# Patient Record
Sex: FEMALE | Race: WHITE | NOT HISPANIC OR LATINO | Employment: FULL TIME | URBAN - METROPOLITAN AREA
[De-identification: names, ages, dates, MRNs, and addresses within clinical notes are randomized per-mention and may not be internally consistent; named-entity substitution may affect disease eponyms.]

---

## 2017-04-01 DIAGNOSIS — Z30.019 ENCOUNTER FOR INITIAL PRESCRIPTION OF CONTRACEPTIVES: ICD-10-CM

## 2017-04-03 RX ORDER — DROSPIRENONE AND ETHINYL ESTRADIOL 0.03MG-3MG
KIT ORAL
Qty: 28 TABLET | Refills: 11 | Status: SHIPPED | OUTPATIENT
Start: 2017-04-03 | End: 2017-05-01

## 2017-04-03 NOTE — TELEPHONE ENCOUNTER
drospirenone-ethinyl estradiol (KIMMY) 3-0.03 MG per tablet    Last Written Prescription Date: 4/18/16  Last Fill Quantity: 28,  # refills: 11   Last Office Visit with [facility], [facility] or [facility] prescribing provider: 4/18/16

## 2017-04-03 NOTE — TELEPHONE ENCOUNTER
Ok to fill per RF. Made appt for physical for Destiny per RF.  Will refill 1 more time.  Mikala Doran, [facility]

## 2017-05-01 ENCOUNTER — OFFICE VISIT (OUTPATIENT)
Dept: FAMILY MEDICINE | Facility: CLINIC | Age: 27
End: 2017-05-01
Payer: COMMERCIAL

## 2017-05-01 ENCOUNTER — RESULT FOLLOW UP (OUTPATIENT)
Dept: FAMILY MEDICINE | Facility: CLINIC | Age: 27
End: 2017-05-01

## 2017-05-01 VITALS
DIASTOLIC BLOOD PRESSURE: 78 MMHG | HEIGHT: 66 IN | SYSTOLIC BLOOD PRESSURE: 130 MMHG | OXYGEN SATURATION: 100 % | RESPIRATION RATE: 16 BRPM | WEIGHT: 131.25 LBS | TEMPERATURE: 98.3 F | HEART RATE: 75 BPM | BODY MASS INDEX: 21.09 KG/M2

## 2017-05-01 DIAGNOSIS — Z00.00 ROUTINE GENERAL MEDICAL EXAMINATION AT A HEALTH CARE FACILITY: Primary | ICD-10-CM

## 2017-05-01 DIAGNOSIS — Z30.41 SURVEILLANCE OF PREVIOUSLY PRESCRIBED CONTRACEPTIVE PILL: ICD-10-CM

## 2017-05-01 DIAGNOSIS — R87.611 PAP SMEAR OF CERVIX WITH ASCUS, CANNOT EXCLUDE HGSIL: ICD-10-CM

## 2017-05-01 DIAGNOSIS — Z12.4 SCREENING FOR CERVICAL CANCER: ICD-10-CM

## 2017-05-01 DIAGNOSIS — R87.810 CERVICAL HIGH RISK HPV (HUMAN PAPILLOMAVIRUS) TEST POSITIVE: ICD-10-CM

## 2017-05-01 PROCEDURE — 99395 PREV VISIT EST AGE 18-39: CPT | Performed by: FAMILY MEDICINE

## 2017-05-01 PROCEDURE — G0124 SCREEN C/V THIN LAYER BY MD: HCPCS | Performed by: FAMILY MEDICINE

## 2017-05-01 PROCEDURE — 87491 CHLMYD TRACH DNA AMP PROBE: CPT | Performed by: FAMILY MEDICINE

## 2017-05-01 PROCEDURE — 87591 N.GONORRHOEAE DNA AMP PROB: CPT | Performed by: FAMILY MEDICINE

## 2017-05-01 PROCEDURE — G0145 SCR C/V CYTO,THINLAYER,RESCR: HCPCS | Performed by: FAMILY MEDICINE

## 2017-05-01 RX ORDER — DROSPIRENONE AND ETHINYL ESTRADIOL 0.03MG-3MG
1 KIT ORAL DAILY
Qty: 28 TABLET | Refills: 11 | Status: SHIPPED | OUTPATIENT
Start: 2017-05-01 | End: 2018-03-30

## 2017-05-01 ASSESSMENT — PAIN SCALES - GENERAL: PAINLEVEL: NO PAIN (0)

## 2017-05-01 NOTE — NURSING NOTE
"Chief Complaint   Patient presents with     Physical       Initial /78 (BP Location: Right arm, Patient Position: Chair, Cuff Size: Adult Regular)  Pulse 75  Temp 98.3  F (36.8  C) (Tympanic)  Resp 16  Ht 5' 5.6\" (1.666 m)  Wt 131 lb 4 oz (59.5 kg)  LMP 04/26/2017 (Exact Date)  SpO2 100%  BMI 21.44 kg/m2 Estimated body mass index is 21.44 kg/(m^2) as calculated from the following:    Height as of this encounter: 5' 5.6\" (1.666 m).    Weight as of this encounter: 131 lb 4 oz (59.5 kg).  Medication Reconciliation: complete    Health Maintenance Due   Topic Date Due     PAP Q3 YR  04/22/2017     Mikala Doran, [facility]      "

## 2017-05-01 NOTE — LETTER
August 27, 2019      Destiny Jade  [address] [facility] [address]    Dear MsRakel,      At [facility], your health and wellness is our primary concern. That is why we are following up on a positive high risk HPV test from 3/8/19, which was reported as HPV type. Your provider had recommended that you have a Colposcopy  completed by 6/8/19. Our records do not show that this has been done or scheduled.      It is important to complete the follow up that your provider has suggested for you to ensure that there are no worsening changes which may, over time, develop into cancer.      If you have chosen not to do the recommended colposcopy, please contact our office at [phone] to schedule an appointment for a repeat PAP smear and HPV test at your earliest convenience.    If you have completed the tests outside of [facility], please have the results forwarded to our office. We will update the chart for your primary Physician to review before your next annual physical.     Thank you for choosing [facility]!    Sincerely,      Your [facility] Care Team/jered

## 2017-05-01 NOTE — LETTER
March 13, 2018      Destiny RAMSEY Yasmany  [address] [place] [address]    Dear MsKamiYasmany,      At [facility], your health and wellness is our primary concern. That is why we are following up on a colposcopy from 05/19/17, which was reported as PAPO 2 and positive for HPV 16 . Your provider had recommended that you have a LEEP completed by 08/19/17. Our records do not show that this has been done.     It is important to complete the follow up that your provider has suggested for you to ensure that there are no worsening changes which may, over time, develop into cancer.      If you have chosen not to do the recommended LEEP, please contact our office at [phone] to schedule an appointment for a repeat PAP smear and HPV test with Dr. Del Castillo at your earliest convenience.    If you have completed the tests outside of [facility], please have the results forwarded to our office. We will update the chart for your primary Physician to review before your next annual physical.     Thank you for choosing [facility]!    Sincerely,      Mayito Hill MD/[facility]

## 2017-05-01 NOTE — PROGRESS NOTES
SUBJECTIVE:     CC: Destiny Jade is an 26 year old woman who presents for preventive health visit.     Healthy Habits:    Do you get at least three servings of calcium containing foods daily (dairy, green leafy vegetables, etc.)? yes    Amount of exercise or daily activities, outside of work: she is active at work- no other exercise program    Problems taking medications regularly No    Medication side effects: No    Have you had an eye exam in the past two years? yes    Do you see a dentist twice per year? no    Do you have sleep apnea, excessive snoring or daytime drowsiness?no    Today's PHQ-2 Score:   PHQ-2 (  [employer]) 2/15/2016 2016   Q1: Little interest or pleasure in doing things 0 0   Q2: Feeling down, depressed or hopeless 0 0   PHQ-2 Score 0 0     Abuse: Current or Past(Physical, Sexual or Emotional)- No  Do you feel safe in your environment - Yes    Social History   Substance Use Topics     Smoking status: Former Smoker     Start date: 2014     Smokeless tobacco: Never Used     Alcohol use No     The patient does not drink >3 drinks per day nor >7 drinks per week.    No results for input(s): CHOL, HDL, LDL, TRIG, CHOLHDLRATIO, NHDL in the last 14117 hours.    Reviewed orders with patient.  Reviewed health maintenance and updated orders accordingly - Yes    Mammo Decision Support:  Mammogram not appropriate for this patient based on age.    Pertinent mammograms are reviewed under the imaging tab.  History of abnormal Pap smear: NO - age 21-29 PAP every 3 years recommended    Reviewed and updated as needed this visit by clinical staff       Reviewed and updated as needed this visit by Provider        Past Medical History:   Diagnosis Date     NO ACTIVE PROBLEMS       Past Surgical History:   Procedure Laterality Date     NO HISTORY OF SURGERY       Obstetric History       T2      TAB0   SAB0   E0   M0   L2       # Outcome Date GA Lbr Aaron/2nd Weight Sex Delivery Anes PTL Lv   3  "Term 02/20/16 40w2d 02:14 / 00:23 7 lb 7.9 oz (3.399 kg) F Vag-Spont EPI  Y      Apgar1:  9               Apgar5: 10   2 Term 11/10/14 40w4d 02:39 / 00:56 8 lb 1.8 oz (3.68 kg) F  INT        Name: Kelsea      Apgar1:  9                Apgar5: 9   1 Para 01/31/11 41w4d 05:23 / 01:30 7 lb 4.9 oz (3.315 kg) F IVD EPI N Y      Name: Radhika      Apgar1:  8                Apgar5: 9      Obstetric Comments   EDC 2/16/2016 based on est. LMP.  (Will have ultrasound to confirm dating.)   to [facility].       ROS:  C: NEGATIVE for fever, chills, change in weight  I: NEGATIVE for worrisome rashes, moles or lesions  E: NEGATIVE for vision changes or irritation  ENT: NEGATIVE for ear, mouth and throat problems  R: NEGATIVE for significant cough or SOB  B: NEGATIVE for masses, tenderness or discharge  CV: NEGATIVE for chest pain, palpitations or peripheral edema  GI: NEGATIVE for nausea, abdominal pain, heartburn, or change in bowel habits  : NEGATIVE for unusual urinary or vaginal symptoms. Periods are regular.  M: NEGATIVE for significant arthralgias or myalgia  N: NEGATIVE for weakness, dizziness or paresthesias  P: NEGATIVE for changes in mood or affect    Problem list, Medication list, Allergies, and Medical/Social/Surgical histories reviewed in EPIC and updated as appropriate.  OBJECTIVE:     /78 (BP Location: Right arm, Patient Position: Chair, Cuff Size: Adult Regular)  Pulse 75  Temp 98.3  F (36.8  C) (Tympanic)  Resp 16  Ht 5' 5.6\" (1.666 m)  Wt 131 lb 4 oz (59.5 kg)  SpO2 100%  BMI 21.44 kg/m2  EXAM:  GENERAL: Well-appearing female, healthy, alert and no distress  EYES: Eyes grossly normal to inspection, PERRL and conjunctivae and sclerae normal. Fundi benign.  HENT: Ear canals and TM's normal, nose and mouth without ulcers or lesions  NECK: No adenopathy, no asymmetry, masses, or scars and thyroid normal to palpation  RESP: Lungs clear to auscultation - no rales, rhonchi or wheezes  BREAST: Not " examined.  CV: Regular rate and rhythm, normal S1 S2, no S3 or S4, no murmur, click or rub   ABDOMEN: Soft, nontender, no hepatosplenomegaly, no masses and bowel sounds normal   (female): Normal female external genitalia, normal urethral meatus, vaginal mucosa pink, moist, well rugated, and normal cervix without masses or discharge. Pap collected.  MS: No gross musculoskeletal defects noted, no edema  SKIN: No suspicious lesions or rashes  NEURO: Normal strength and tone, mentation intact and speech normal  PSYCH: Mentation appears normal, affect normal/bright    ASSESSMENT/PLAN:     (Z00.00) Routine general medical examination at a health care facility  (primary encounter diagnosis)  Comment: Destiny Jade is a healthy 26 year old  presenting for routine well visit. Patient is doing well and has no concerns. She would like renewal of her OCPs today. On exam she is well-appearing with slight prehypertensive blood pressure reading but otherwise normal exam.  Plan: NEISSERIA GONORRHOEA PCR, CHLAMYDIA TRACHOMATIS        PCR  - GC/Chlam collected  - Problem-specific plans as below  - Preventative health counseling    (Z12.4) Screening for cervical cancer  Comment: No history of abnormal paps.   Plan: PAP imaged thin layer screen reflex to HPV if         ASCUS - recommended age 25 - 29 years  - If normal, repeat pap in 3 years. Will call patient if abnormal results.    (Z30.41) Surveillance of previously prescribed contraceptive pill  Comment: Patient happy with current OCPs. Currently at end of her menstrual cycle.  Plan: drospirenone-ethinyl estradiol (OCELLA) 3-0.03         MG per tablet  - Continue with OCPs as prescribed. Follow-up for renewal in 1 year.    COUNSELING:   Reviewed preventive health counseling, as reflected in patient instructions  Special attention given to:        Regular exercise       Healthy diet/nutrition       Vision screening       Hearing screening       Contraception    BP  "Screening:   Last 3 BP Readings:    BP Readings from Last 3 Encounters:   05/01/17 130/78   04/18/16 102/64   02/22/16 108/64     The following was recommended to the patient:  Re-screen BP within a year and recommended lifestyle modifications     reports that she has quit smoking. She started smoking about 3 years ago. She has never used smokeless tobacco.    Estimated body mass index is 21.44 kg/(m^2) as calculated from the following:    Height as of this encounter: 5' 5.6\" (1.666 m).    Weight as of this encounter: 131 lb 4 oz (59.5 kg).     Counseling Resources:  ATP IV Guidelines  Pooled Cohorts Equation Calculator  Breast Cancer Risk Calculator  FRAX Risk Assessment  ICSI Preventive Guidelines  Dietary Guidelines for Americans, 2010  USDA's MyPlate  ASA Prophylaxis  Lung CA Screening    Patient was seen and examined by myself and Dr. Hill. The note was then scribed by me.    Charissa Wharton, MS3    This patient was seen and examined by myself as well as the medical student.  The medical student scribed the note and I have reviewed it, edited it appropriately, and agree with the final documentation.     Electronically signed by:   Mayito Hill MD    5/1/2017     "

## 2017-05-01 NOTE — LETTER
August 27, 2019      Destiny ARMSEY Yasmany  [address] [facility] [address]    Dear MsRakel,      At [facility], your health and wellness is our primary concern. That is why we are following up on a positive high risk HPV test from 3/8/19, which was reported as HPV type 16. Your provider had recommended that you have a Colposcopy  completed by 6/8/19. Our records do not show that this has been done or scheduled.      It is important to complete the follow up that your provider has suggested for you to ensure that there are no worsening changes which may, over time, develop into cancer.      If you have chosen not to do the recommended colposcopy, please contact our office at [phone] to schedule an appointment for a repeat PAP smear and HPV test at your earliest convenience.    If you have completed the tests outside of [facility], please have the results forwarded to our office. We will update the chart for your primary Physician to review before your next annual physical.     Thank you for choosing [facility]!    Sincerely,      Your [facility] Care Team/jered

## 2017-05-01 NOTE — LETTER
October 26, 2017      Destiny Jade  [address] [place] [address]    Dear MsDeepay,      At [facility], your health and wellness is our primary concern. That is why we are following up on a colposcopy from 5/19/17, which was reported as PAPO 2 and positive for HPV 16 . Your provider had recommended that you have a LEEP completed by 8/19/17. Our records do not show that this has been done.      It is important to complete the follow up that your provider has suggested for you to ensure that there are no worsening changes which may, over time, develop into cancer.      Please contact our [place] office at  [phone] to schedule an appointment for a LEEP at your earliest convenience. If you have questions or concerns, please call the clinic and we will be happy to assist you.    If you have completed the tests outside of [facility], please have the results forwarded to our office. We will update the chart for your primary Physician to review before your next annual physical.     Thank you for choosing [facility]!    Sincerely,      Mayito Hill MD/jered

## 2017-05-01 NOTE — LETTER
May 8, 2019      Destiny Jade  [address] [facility] [address]    Dear MsRakel,      At [facility], your health and wellness is our primary concern. That is why we are following up on a positive high risk HPV test from 3/8/19, which was reported as HPV type 16. Your provider had recommended that you have a Colposcopy  completed by 6/8/19. Our records do not show that this has been scheduled.    It is important to complete the follow up that your provider has suggested for you to ensure that there are no worsening changes which may, over time, develop into cancer.      Please contact our office at  [phone] to schedule an appointment for a Colposcopy (this cannot be scheduled through [facility]) at your earliest convenience. If you have questions or concerns, please call the clinic and we will be happy to assist you.    If you have completed the tests outside of [facility], please have the results forwarded to our office. We will update the chart for your primary Physician to review before your next annual physical.     Thank you for choosing [facility]!    Sincerely,      Your [facility] Care Team/jered

## 2017-05-01 NOTE — LETTER
September 15, 2017      Destiny Jade  [address] [place] [address]    Dear MsDeepay,      At [facility], your health and wellness is our primary concern. That is why we are following up on a colposcopy from 5/19/17, which was reported as PAPO 2 and positive for HPV 16 . Your provider had recommended that you have a LEEP completed by 8/19/17. Our records do not show that this has been done.      It is important to complete the follow up that your provider has suggested for you to ensure that there are no worsening changes which may, over time, develop into cancer.      Please contact our [place] office at  [phone] to schedule an appointment for a LEEP at your earliest convenience. If you have questions or concerns, please call the clinic and we will be happy to assist you.    If you have completed the tests outside of [facility], please have the results forwarded to our office. We will update the chart for your primary Physician to review before your next annual physical.     Thank you for choosing [facility]!    Sincerely,      Mayito Hill MD/jered

## 2017-05-01 NOTE — MR AVS SNAPSHOT
After Visit Summary   5/1/2017    Destiny Jade    MRN: [id number]           Patient Information     Date Of Birth          1990        Visit Information        Provider Department      5/1/2017 5:30 PM Mayito Hill MD [facility]        Today's Diagnoses     Routine general medical examination at a health care facility    -  1    Screening for cervical cancer        Surveillance of previously prescribed contraceptive pill          Care Instructions      Preventive Health Recommendations  Female Ages 26 - 39  Yearly exam:   See your health care provider every year in order to    Review health changes.     Discuss preventive care.      Review your medicines if you your doctor has prescribed any.    Until age 30: Get a Pap test every three years (more often if you have had an abnormal result).    After age 30: Talk to your doctor about whether you should have a Pap test every 3 years or have a Pap test with HPV screening every 5 years.   You do not need a Pap test if your uterus was removed (hysterectomy) and you have not had cancer.  You should be tested each year for STDs (sexually transmitted diseases), if you're at risk.   Talk to your provider about how often to have your cholesterol checked.  If you are at risk for diabetes, you should have a diabetes test (fasting glucose).  Shots: Get a flu shot each year. Get a tetanus shot every 10 years.   Nutrition:     Eat at least 5 servings of fruits and vegetables each day.    Eat whole-grain bread, whole-wheat pasta and brown rice instead of white grains and rice.    Talk to your provider about Calcium and Vitamin D.     Lifestyle    Exercise at least 150 minutes a week (30 minutes a day, 5 days of the week). This will help you control your weight and prevent disease.    Limit alcohol to one drink per day.    No smoking.     Wear sunscreen to prevent skin cancer.    See your dentist every six months for an exam and  "cleaning.          Follow-ups after your visit        Who to contact     If you have questions or need follow up information about today's clinic visit or your schedule please contact [facility] directly at [phone].  Normal or non-critical lab and imaging results will be communicated to you by MyChart, letter or phone within 4 business days after the clinic has received the results. If you do not hear from us within 7 days, please contact the clinic through MyChart or phone. If you have a critical or abnormal lab result, we will notify you by phone as soon as possible.  Submit refill requests through Posterous or call your pharmacy and they will forward the refill request to us. Please allow 3 business days for your refill to be completed.          Additional Information About Your Visit        Posterous Information     Posterous lets you send messages to your doctor, view your test results, renew your prescriptions, schedule appointments and more. To sign up, go to [email or web address] . Click on \"Log in\" on the left side of the screen, which will take you to the Welcome page. Then click on \"Sign up Now\" on the right side of the page.     You will be asked to enter the access code listed below, as well as some personal information. Please follow the directions to create your username and password.     Your access code is: [email or web address]  Expires: 2017  8:11 AM     Your access code will  in 90 days. If you need help or a new code, please call your [place] clinic or [phone].        Care EveryWhere ID     This is your Care EveryWhere ID. This could be used by other organizations to access your [place] medical records  [id number]        Your Vitals Were     Pulse Temperature Respirations Height Last Period Pulse Oximetry    75 98.3  F (36.8  C) (Tympanic) 16 5' 5.6\" (1.666 m) 2017 (Exact Date) 100%    BMI (Body Mass Index)                   21.44 kg/m2            Blood Pressure " from Last 3 Encounters:   05/01/17 130/78   04/18/16 102/64   02/22/16 108/64    Weight from Last 3 Encounters:   05/01/17 131 lb 4 oz (59.5 kg)   04/18/16 147 lb 4 oz (66.8 kg)   02/15/16 155 lb (70.3 kg)              We Performed the Following     CHLAMYDIA TRACHOMATIS PCR     NEISSERIA GONORRHOEA PCR     PAP imaged thin layer screen reflex to HPV if ASCUS - recommended age 25 - 29 years          Today's Medication Changes          These changes are accurate as of: 5/1/17 11:59 PM.  If you have any questions, ask your nurse or doctor.               These medicines have changed or have updated prescriptions.        Dose/Directions    drospirenone-ethinyl estradiol 3-0.03 MG per tablet   Commonly known as:  OCELLA   This may have changed:  See the new instructions.   Used for:  Surveillance of previously prescribed contraceptive pill   Changed by:  Mayito Hill MD        Dose:  1 tablet   Take 1 tablet by mouth daily   Quantity:  28 tablet   Refills:  11            Where to get your medicines      These medications were sent to [facility] [address] [facility]   [address] , [facility] [address]     Phone:  [phone]     drospirenone-ethinyl estradiol 3-0.03 MG per tablet                Primary Care Provider Office Phone # Fax #    Mayito Hill -[phone]       [facility] [address] [facility] [address]        Thank you!     Thank you for choosing [facility]  for your care. Our goal is always to provide you with excellent care. Hearing back from our patients is one way we can continue to improve our services. Please take a few minutes to complete the written survey that you may receive in the mail after your visit with us. Thank you!             Your Updated Medication List - Protect others around you: Learn how to safely use, store and throw away your medicines at www.disposemymeds.org.          This list is accurate as of:  5/1/17 11:59 PM.  Always use your most recent med list.                   Brand Name Dispense Instructions for use    drospirenone-ethinyl estradiol 3-0.03 MG per tablet    OCELLA    28 tablet    Take 1 tablet by mouth daily

## 2017-05-03 LAB
C TRACH DNA SPEC QL NAA+PROBE: NORMAL
N GONORRHOEA DNA SPEC QL NAA+PROBE: NORMAL
SPECIMEN SOURCE: NORMAL
SPECIMEN SOURCE: NORMAL

## 2017-05-04 LAB
COPATH REPORT: ABNORMAL
PAP: ABNORMAL

## 2017-05-05 NOTE — PROGRESS NOTES
2013 & 2014 NIL paps  5/1/17 [facility] pap @ 27 yo. Plan: colp bef 8/1/17 5/5/17 Pt. Advised.  5/19/17 colp PAPO 2, + HPV # 16. Plan: LEEP bef 8/19/17  9/15/17 LEEP Reminder letter sent per RN request (rlm)  10/26/17 LEEP Reminder letter sent per RN request (rlm)  11/15/17 Tele Enc:   Dr. Del Castillo's team reminded patient of need for LEEP. Patient reported that she would like to think over her options and would call back to schedule when ready.  03/13/18 LEEP not done, chart updated for LEEP/6mo pap. Letter sent. ([facility])  3/19/18 future visit with Dr. Del Castillo for repeat pap/hpv on 5/11/18. FYI staff msg sent to provider. Per staff msg from Dr. Del Castillo, patient agrees to have colp also, at visit on 5/11/18.  5/11/18: [facility] benign NIL pap, + HR HPV type 16 result. Plan pap in 1 year per provider. ([facility])  5/23/18 Pt notified. ([facility])  3/8/19 NIL pap, + HR HPV # 16 (no 18 or other). Plan: colp.   3/14/19 Left msg  3/18/19 Patient notified by phone.  5/8/19 [facility] reminder letter sent (rlm)  6/11/19 3mo [facility] not done, updated to 6mo [facility]/Pap due by 9/8/19 (rlm)  8/27/19 [facility]/Pap reminder letter sent (rlm)  10/07/19 [facility] clinic and schedule. ([facility])  11/5/19 Patient is lost to follow-up. Routed to provider as FYI. ([facility]m)

## 2017-05-19 ENCOUNTER — OFFICE VISIT (OUTPATIENT)
Dept: FAMILY MEDICINE | Facility: CLINIC | Age: 27
End: 2017-05-19
Payer: COMMERCIAL

## 2017-05-19 VITALS
HEIGHT: 66 IN | DIASTOLIC BLOOD PRESSURE: 74 MMHG | WEIGHT: 130 LBS | BODY MASS INDEX: 20.89 KG/M2 | OXYGEN SATURATION: 100 % | HEART RATE: 70 BPM | TEMPERATURE: 98.2 F | SYSTOLIC BLOOD PRESSURE: 114 MMHG

## 2017-05-19 DIAGNOSIS — R87.611 PAP SMEAR OF CERVIX WITH ASCUS, CANNOT EXCLUDE HGSIL: Primary | ICD-10-CM

## 2017-05-19 LAB — BETA HCG QUAL IFA URINE: NEGATIVE

## 2017-05-19 PROCEDURE — 57454 BX/CURETT OF CERVIX W/SCOPE: CPT | Performed by: FAMILY MEDICINE

## 2017-05-19 PROCEDURE — 84703 CHORIONIC GONADOTROPIN ASSAY: CPT | Performed by: FAMILY MEDICINE

## 2017-05-19 PROCEDURE — 87624 HPV HI-RISK TYP POOLED RSLT: CPT | Performed by: FAMILY MEDICINE

## 2017-05-19 ASSESSMENT — PAIN SCALES - GENERAL: PAINLEVEL: NO PAIN (0)

## 2017-05-19 NOTE — MR AVS SNAPSHOT
"              After Visit Summary   2017    Destiny Jade    MRN: [id number]           Patient Information     Date Of Birth          1990        Visit Information        Provider Department      2017 12:15 PM Pam Del Castillo MD [facility]        Today's Diagnoses     ASC-H Pap smear of cervix ( ASCUS, cannot exclude HGSIL)    -  1       Follow-ups after your visit        Who to contact     If you have questions or need follow up information about today's clinic visit or your schedule please contact [facility] directly at [phone].  Normal or non-critical lab and imaging results will be communicated to you by WITOIhart, letter or phone within 4 business days after the clinic has received the results. If you do not hear from us within 7 days, please contact the clinic through WITOIhart or phone. If you have a critical or abnormal lab result, we will notify you by phone as soon as possible.  Submit refill requests through CeQur or call your pharmacy and they will forward the refill request to us. Please allow 3 business days for your refill to be completed.          Additional Information About Your Visit        MyChart Information     CeQur lets you send messages to your doctor, view your test results, renew your prescriptions, schedule appointments and more. To sign up, go to [email or web address] . Click on \"Log in\" on the left side of the screen, which will take you to the Welcome page. Then click on \"Sign up Now\" on the right side of the page.     You will be asked to enter the access code listed below, as well as some personal information. Please follow the directions to create your username and password.     Your access code is: [email or web address]  Expires: 2017  8:11 AM     Your access code will  in 90 days. If you need help or a new code, please call your [facility] or [phone].        Care EveryWhere ID     This is your Care " "EveryWhere ID. This could be used by other organizations to access your [place] medical records  [id number]        Your Vitals Were     Pulse Temperature Height Last Period Pulse Oximetry BMI (Body Mass Index)    70 98.2  F (36.8  C) (Temporal) 5' 5.6\" (1.666 m) 04/26/2017 (Exact Date) 100% 21.24 kg/m2       Blood Pressure from Last 3 Encounters:   05/19/17 114/74   05/01/17 130/78   04/18/16 102/64    Weight from Last 3 Encounters:   05/19/17 130 lb (59 kg)   05/01/17 131 lb 4 oz (59.5 kg)   04/18/16 147 lb 4 oz (66.8 kg)              We Performed the Following     Beta HCG qual IFA urine     HPV High Risk Types DNA Cervical     Surgical pathology exam        Primary Care Provider Office Phone # Fax #    Mayito Hill [phone]       [address]   [facility] [address]        Thank you!     Thank you for choosing [facility]  for your care. Our goal is always to provide you with excellent care. Hearing back from our patients is one way we can continue to improve our services. Please take a few minutes to complete the written survey that you may receive in the mail after your visit with us. Thank you!             Your Updated Medication List - Protect others around you: Learn how to safely use, store and throw away your medicines at www.disposemymeds.org.          This list is accurate as of: 5/19/17  1:35 PM.  Always use your most recent med list.                   Brand Name Dispense Instructions for use    drospirenone-ethinyl estradiol 3-0.03 MG per tablet    OCELLA    28 tablet    Take 1 tablet by mouth daily         "

## 2017-05-19 NOTE — NURSING NOTE
"Chief Complaint   Patient presents with     Colposcopy     consent signed       Initial /74  Pulse 70  Temp 98.2  F (36.8  C) (Temporal)  Ht 5' 5.6\" (1.666 m)  Wt 130 lb (59 kg)  LMP 04/26/2017 (Exact Date)  SpO2 100%  BMI 21.24 kg/m2 Estimated body mass index is 21.24 kg/(m^2) as calculated from the following:    Height as of this encounter: 5' 5.6\" (1.666 m).    Weight as of this encounter: 130 lb (59 kg).  Medication Reconciliation: complete   Julia Baird CMA    "

## 2017-05-23 ENCOUNTER — TELEPHONE (OUTPATIENT)
Dept: FAMILY MEDICINE | Facility: CLINIC | Age: 27
End: 2017-05-23

## 2017-05-23 LAB — COPATH REPORT: NORMAL

## 2017-05-23 NOTE — PROGRESS NOTES
I left her a message to call back to discuss, will recommend a LEEP for CIN2.    Pam Del Castillo MD

## 2017-05-25 PROBLEM — R87.810 CERVICAL HIGH RISK HPV (HUMAN PAPILLOMAVIRUS) TEST POSITIVE: Status: ACTIVE | Noted: 2017-05-19

## 2017-05-25 LAB
FINAL DIAGNOSIS: ABNORMAL
HPV HR 12 DNA CVX QL NAA+PROBE: NEGATIVE
HPV16 DNA SPEC QL NAA+PROBE: POSITIVE
HPV18 DNA SPEC QL NAA+PROBE: NEGATIVE
SPECIMEN DESCRIPTION: ABNORMAL

## 2017-05-31 NOTE — PROGRESS NOTES
I did speak with her and recommended a LEEP. Please mail her info on the LEEP procedure as well as cervical dysplasia packet.   Pam Del Castillo MD

## 2017-06-05 NOTE — PROGRESS NOTES
S:  Destiny Jade is a 26 year old female here for a colposcopy.  She was referred to me by Dr Mayito Hill.  Her last pap smear:    Lab Results   Component Value Date    PAP ASC-H 05/01/2017     Previous paps normal: yes as follows:  Lab Results   Component Value Date          PAP NIL 04/22/2014    PAP NIL 10/08/2013       Cervical risk factors: Tobacco Use no, previous use but she quit 2 years ago.       Lifetime sexual partners: 10      Previous STD chlamydia about 6 years ago, treated.  None since.       Previous dysplasia: no      Previous colposcopy: no.       Previous treatment:  none       Age at first intercourse: 15  Current birth control: OCP  Patient's last menstrual period was 04/26/2017 (exact date).  I discussed the history of HPV and the risk of dysplasia/cancer.  HCG urine was negative today.     I explained the indications for the colposcopy and the procedure in detail.  I discussed the potential risks including bleeding, infection and cramping. I have recommend abstinence for 2 weeks and no vigorous activity for 48 hours.  Consent form was signed by patient and all questions were answered.    O:  Vitals noted.  Patient alert, oriented, and in no acute distress. She was placed on the exam table in the dorsal position and a sterile speculum inserted into the vagina. The cervix was easily visualized.  Acetic acid was applied to better visualize the transformation zone.  Colposcopy was performed in the usual fashion.  One biopsy taken from the 12:00 position, and an ECC was performed.  Please see scanned colposcopy form for details and findings.    A:  Colposcopy of the cervix and upper/adjacent vagina with biopsy and ECC.       ASC-H pap smear.    P:  Will await pathology results, if no dysplasia, repeat pap in 12 months, if CIN1, recommend Cryo or Repeat pap in 12 months, if CIN2-3 recommend LEEP or Cryo and if ECC positive, recommend LEEP        Copy of chart forwarded to primary care  provider.    Discussed symptoms to watch for, including bleeding through 1 pad or more per hour, heavy cramps or abdominal pain, fever, or purulent foul smelling discharge.  If these occur, then she will call or return for follow up.    Pam Del Castillo MD

## 2017-11-01 ENCOUNTER — TELEPHONE (OUTPATIENT)
Dept: FAMILY MEDICINE | Facility: CLINIC | Age: 27
End: 2017-11-01

## 2017-11-01 NOTE — TELEPHONE ENCOUNTER
Reason for Call:  Other call back    Detailed comments: Patient had a colp in May. She would like to have another one to see if there are any changes. Can you call her back and let her know if you think this would be a good idea or should she wait a little longer. Please call and advise.    Phone Number Patient can be reached at: Home number on file [phone] (home)    Best Time: any    Can we leave a detailed message on this number? YES    Call taken on 11/1/2017 at 4:33 PM by Ida Lopez

## 2017-11-06 NOTE — TELEPHONE ENCOUNTER
Patient called wondering the status of message below. She was informed that Dr. Jimenez was out of clinic and returns tomorrow. Please call her back tomorrow at your earliest convenience.     Thank you  Enrrique Pulido  Patient Representative

## 2017-11-07 NOTE — TELEPHONE ENCOUNTER
Result Notes   Notes Recorded by Pam Del Castillo MD on 5/23/2017 at 10:46 AM  I left her a message to call back to discuss, will recommend a LEEP for CIN2.    Pam Del Castillo MD

## 2017-11-15 NOTE — TELEPHONE ENCOUNTER
Patient returned Julia's call.  Please call her back as soon as you can, thank you.    Kirstie SPANGLER

## 2017-11-15 NOTE — TELEPHONE ENCOUNTER
Attempt to contact patient at number listed, unable to hear patient due to a bad connection. Advised patient to call back when she has better service.  Julia Baird CMA   Per recommendations on 5/23/2017 patient should have a LEEP procedure instead of a colposcopy. She can come in for office visit to discuss the procedure or just schedule the procedure on a Friday during Pam Del Castillo MD procedure times.  Julia Baird CMA

## 2017-11-15 NOTE — TELEPHONE ENCOUNTER
Spoke with patient and gave her msg below. Pt would like to think about her options and will call back to schedule when ready. Yumiko Wells, CMA

## 2018-03-16 ENCOUNTER — HEALTH MAINTENANCE LETTER (OUTPATIENT)
Age: 28
End: 2018-03-16

## 2018-03-19 ENCOUNTER — TELEPHONE (OUTPATIENT)
Dept: FAMILY MEDICINE | Facility: CLINIC | Age: 28
End: 2018-03-19

## 2018-03-19 NOTE — TELEPHONE ENCOUNTER
I called and spoke to Destiny bolton. I advised her that she should have a repeat colp and not just a pap.  She is fine with that. Please change her scheduled May appointment to a colposcopy.  She is aware.   Thanks.   Pam Del Castillo MD

## 2018-03-19 NOTE — TELEPHONE ENCOUNTER
----- Message from Aidee Shaffer, RN sent at 3/19/2018  3:03 PM CDT -----  Regarding: [address]   Patient has hx of PAPO 2 on [place] from May of 2017. She was advised to complete LEEP, but did not complete. She how has scheduled a visit for 5/11/18 for repeat pap/hpv.     2013 & 2014 NIL paps  5/1/17 ASC-H pap @ 25 yo. Plan: colp bef 8/1/17 5/5/17 Pt. Advised.  5/19/17 colp PAPO 2, + HPV # 16. Plan: LEEP bef 8/19/17  9/15/17 LEEP Reminder letter sent per RN request (rlm)  10/26/17 LEEP Reminder letter sent per RN request (rlm)  11/15/17 Tele Enc:   Dr. Del Castillo's team reminded patient of need for LEEP. Patient reported that she would like to think over her options and would call back to schedule when ready.  03/13/18 LEEP not done, chart updated for LEEP/6mo pap. Letter sent. ([facility])  5/11/18 Visit with Dr. Del Castillo for repeat pap/hpv.    Thank you,  Aidee Shaffer, JASMYNN, RN, Pap Tracking Nurse

## 2018-03-20 ENCOUNTER — TELEPHONE (OUTPATIENT)
Dept: FAMILY MEDICINE | Facility: CLINIC | Age: 28
End: 2018-03-20

## 2018-03-20 NOTE — TELEPHONE ENCOUNTER
Reason for Call:  Appointment, Requested Provider:  Pam Del Castillo M.D.    PCP: Mayito Hill    Reason for visit: Patient states she has an appt with Dr Del Castillo in May and is having some issues (abnormal periods) so she would like to be seen sooner than that if she could work her in. Please advise    Duration of symptoms: 2 months    Have you been treated for this in the past? No    Additional comments:     Can we leave a detailed message on this number? YES    Phone number patient can be reached at: Home number on file [phone] (home)    Best Time: any    Call taken on 3/20/2018 at 4:10 PM by Ruth Arroyo

## 2018-03-20 NOTE — TELEPHONE ENCOUNTER
Patient can be worked in for abnormal periods on 3/30 at 8:45am. Please contact patient to advise and confirm. Appointment made to hold time.  Julia Baird CMA

## 2018-03-30 ENCOUNTER — OFFICE VISIT (OUTPATIENT)
Dept: FAMILY MEDICINE | Facility: CLINIC | Age: 28
End: 2018-03-30
Payer: COMMERCIAL

## 2018-03-30 VITALS
DIASTOLIC BLOOD PRESSURE: 52 MMHG | SYSTOLIC BLOOD PRESSURE: 88 MMHG | HEART RATE: 77 BPM | OXYGEN SATURATION: 100 % | WEIGHT: 135.2 LBS | HEIGHT: 66 IN | BODY MASS INDEX: 21.73 KG/M2 | TEMPERATURE: 97.4 F

## 2018-03-30 DIAGNOSIS — N92.6 IRREGULAR MENSTRUAL CYCLE: Primary | ICD-10-CM

## 2018-03-30 LAB
ERYTHROCYTE [DISTWIDTH] IN BLOOD BY AUTOMATED COUNT: 13.9 % (ref 10–15)
HCG UR QL: NEGATIVE
HCT VFR BLD AUTO: 40.2 % (ref 35–47)
HGB BLD-MCNC: 13.1 G/DL (ref 11.7–15.7)
MCH RBC QN AUTO: 28.5 PG (ref 26.5–33)
MCHC RBC AUTO-ENTMCNC: 32.6 G/DL (ref 31.5–36.5)
MCV RBC AUTO: 87 FL (ref 78–100)
PLATELET # BLD AUTO: 173 10E9/L (ref 150–450)
RBC # BLD AUTO: 4.6 10E12/L (ref 3.8–5.2)
SPECIMEN SOURCE: NORMAL
TSH SERPL DL<=0.005 MIU/L-ACNC: 1.51 MU/L (ref 0.4–4)
WBC # BLD AUTO: 4.7 10E9/L (ref 4–11)
WET PREP SPEC: NORMAL

## 2018-03-30 PROCEDURE — 99213 OFFICE O/P EST LOW 20 MIN: CPT | Performed by: FAMILY MEDICINE

## 2018-03-30 PROCEDURE — 85027 COMPLETE CBC AUTOMATED: CPT | Performed by: FAMILY MEDICINE

## 2018-03-30 PROCEDURE — 36415 COLL VENOUS BLD VENIPUNCTURE: CPT | Performed by: FAMILY MEDICINE

## 2018-03-30 PROCEDURE — 87210 SMEAR WET MOUNT SALINE/INK: CPT | Performed by: FAMILY MEDICINE

## 2018-03-30 PROCEDURE — 84443 ASSAY THYROID STIM HORMONE: CPT | Performed by: FAMILY MEDICINE

## 2018-03-30 PROCEDURE — 81025 URINE PREGNANCY TEST: CPT | Performed by: FAMILY MEDICINE

## 2018-03-30 ASSESSMENT — PAIN SCALES - GENERAL: PAINLEVEL: NO PAIN (0)

## 2018-03-30 NOTE — MR AVS SNAPSHOT
After Visit Summary   3/30/2018    Destiny Jade    MRN: [id number]           Patient Information     Date Of Birth          1990        Visit Information        Provider Department      3/30/2018 8:45 AM Pam Del Castillo MD [facility]        Today's Diagnoses     Irregular menstrual cycle    -  1       Follow-ups after your visit        Your next 10 appointments already scheduled     May 11, 2018  9:15 AM CDT   Office Visit with Pam Del Castillo MD   [facility]    [address] [place] [id number] [phone]           Bring a current list of meds and any records pertaining to this visit. For Physicals, please bring immunization records and any forms needing to be filled out. Please arrive 10 minutes early to complete paperwork.            May 11, 2018  9:15 AM CDT   PROCEDURE with NL PROC ROOM ONE [facility] ([address] [id number] [phone]              Future tests that were ordered for you today     Open Future Orders        Priority Expected Expires Ordered    US Pelvic Complete w Transvaginal Routine  3/30/2019 3/30/2018            Who to contact     If you have questions or need follow up information about today's clinic visit or your schedule please contact [facility] directly at [phone].  Normal or non-critical lab and imaging results will be communicated to you by MyChart, letter or phone within 4 business days after the clinic has received the results. If you do not hear from us within 7 days, please contact the clinic through MyChart or phone. If you have a critical or abnormal lab result, we will notify you by phone as soon as possible.  Submit refill requests through Nu-B-2B or call your pharmacy and they will forward the refill request to us. Please allow 3 business days for  "your refill to be completed.          Additional Information About Your Visit        Bonsai AIhart Information     Hexaformer lets you send messages to your doctor, view your test results, renew your prescriptions, schedule appointments and more. To sign up, go to [email or web address] . Click on \"Log in\" on the left side of the screen, which will take you to the Welcome page. Then click on \"Sign up Now\" on the right side of the page.     You will be asked to enter the access code listed below, as well as some personal information. Please follow the directions to create your username and password.     Your access code is: [email or web address]  Expires: 2018 11:48 AM     Your access code will  in 90 days. If you need help or a new code, please call your [place] clinic or [phone].        Care EveryWhere ID     This is your Care EveryWhere ID. This could be used by other organizations to access your [place] medical records  [id number]        Your Vitals Were     Pulse Temperature Height Last Period Pulse Oximetry Breastfeeding?    77 97.4  F (36.3  C) (Temporal) 5' 5.5\" (1.664 m) 2018 (Approximate) 100% No    BMI (Body Mass Index)                   22.16 kg/m2            Blood Pressure from Last 3 Encounters:   18 (!) 88/52   17 114/74   17 130/78    Weight from Last 3 Encounters:   18 135 lb 3.2 oz (61.3 kg)   17 130 lb (59 kg)   17 131 lb 4 oz (59.5 kg)              We Performed the Following     CBC with platelets     HCG qualitative urine     TSH with free T4 reflex     Wet prep        Primary Care Provider Office Phone # Fax #    Mayito Hill [phone]       [address] [place] [address]        Equal Access to Services     NHUNG LOVE : Salomon Carrera, dawson mcdonnell, conchis stokes. [facility] [phone].    ATENCIÓN: Si sumanth arroyo, tiene a street disposición servicios " jose de asistencia lingüística. Ophelia salguero [phone].    We comply with applicable federal civil rights laws and [place] laws. We do not discriminate on the basis of race, color, national origin, age, disability, sex, sexual orientation, or gender identity.            Thank you!     Thank you for choosing [facility]  for your care. Our goal is always to provide you with excellent care. Hearing back from our patients is one way we can continue to improve our services. Please take a few minutes to complete the written survey that you may receive in the mail after your visit with us. Thank you!             Your Updated Medication List - Protect others around you: Learn how to safely use, store and throw away your medicines at www.disposemymeds.org.      Notice  As of 3/30/2018 11:48 AM    You have not been prescribed any medications.

## 2018-03-30 NOTE — PROGRESS NOTES
"S:  Destiny Jade is a 27 year old female who complains of irregular periods. Onset 2 months ago with weekly \"spotting\" lasting 1 to 3 days in addition to her \"normal\" monthly menstrual cycle.  She describes light bleeding and cramping when spotting between menses, in addition to her \"normal\" period. Her LMP was approximately 3/14/18. She denies abdominal pain, other vaginal discharge, headaches, fatigue, fever or changes with BMs. No changes in frequency or urgency with urination. No recent changes in weight. Not currently using any medications. History of normal monthly menstrual periods up until 2 months ago, with heavier bleeding and light cramping but only during the expected time. She denies history of STDs. She stopped birth control last summer, not currently using. Actively trying to conceive, took at-home pregnancy test which was negative. She is interested in pregnancy test today. . She is scheduled for colp and pap smear in May due to history of CIN2. No other concerns today.     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 Plan: colp bef 17 colp PAPO 2, + HPV # 16. Plan: LEEP bef 17 LEEP not done, chart updated for LEEP/6mo pap. ([facility])   18 Visit with Dr. Del Castillo.       CARDIOVASCULAR SCREENING; LDL GOAL LESS THAN 160 2014     Priority: Medium        Past Medical History:   Diagnosis Date     ASC-H Pap smear of cervix ( ASCUS, cannot exclude HGSIL) 2017     H/O colposcopy with cervical biopsy 2017    PAPO 2        Past Surgical History:   Procedure Laterality Date     NO HISTORY OF SURGERY          Family History   Problem Relation Age of Onset     Hypertension Father      Breast Cancer Maternal " "Grandmother      Obesity Maternal Grandfather      DIABETES Paternal Grandmother      Obesity Paternal Grandmother      CANCER Paternal Grandfather      Lung Cancer Paternal Grandfather      Bladder Cancer Paternal Grandfather        7 pt. ROS is otherwise negative.    O:  Vitals noted.  Patient alert, oriented, and in no acute distress.   CV:  RRR without murmur.  Respiratory:  Lungs clear to auscultation bilaterally.   Abdomen:  Soft, nontender, nondistended with good bowel sounds and no masses or hepatosplenomegaly.   Vaginal exam reveals normal external and internal genitalia.  Cervix is closed, long and thick.  No lesions or abnormalities seen.  Bimanual exam reveals a nontender, nongravid uterus with no CMT.  No adnexal masses or tenderness.       Orders Placed This Encounter   Procedures     US Pelvic Complete w Transvaginal     HCG qualitative urine     TSH with free T4 reflex     CBC with platelets        A:      ICD-10-CM    1. Irregular menstrual cycle N92.6 HCG qualitative urine     Wet prep     TSH with free T4 reflex     CBC with platelets     US Pelvic Complete w Transvaginal     CANCELED: Beta HCG Qual, Urine - [facility] and [place] ([id number])       P:  We discussed the possibility that this might just be a brief change in her cycle, and may go back to \"normal\" soon. She is going to continue to monitor over the next few months. In the meantime, we are going to check some labs today to rule out thyroid disorder, infection, anemia, and pelvic pathology such as fibroids or cysts, and if all normal, we may just do nothing.  She will continue with her scheduled pap/colp in May.    If her cycles remain abnormal, we have the option of going back on OCPs or considering other hormonal treatment such as progesterone IUD, as long as she is not interested in conceiving.      Pam Del Castillo MD   "

## 2018-03-30 NOTE — NURSING NOTE
"Chief Complaint   Patient presents with     Abnormal Bleeding Problem     stop birth control last summer, now periods are light and not regular       Initial BP (!) 88/52  Pulse 77  Temp 97.4  F (36.3  C) (Temporal)  Ht 5' 5.5\" (1.664 m)  Wt 135 lb 3.2 oz (61.3 kg)  LMP 03/14/2018 (Approximate)  SpO2 100%  Breastfeeding? No  BMI 22.16 kg/m2 Estimated body mass index is 22.16 kg/(m^2) as calculated from the following:    Height as of this encounter: 5' 5.5\" (1.664 m).    Weight as of this encounter: 135 lb 3.2 oz (61.3 kg).  Medication Reconciliation: complete   Julia Baird CMA    "

## 2018-04-04 ENCOUNTER — TELEPHONE (OUTPATIENT)
Dept: FAMILY MEDICINE | Facility: CLINIC | Age: 28
End: 2018-04-04

## 2018-04-04 ENCOUNTER — HOSPITAL ENCOUNTER (OUTPATIENT)
Dept: ULTRASOUND IMAGING | Facility: CLINIC | Age: 28
Discharge: HOME | End: 2018-04-04
Attending: FAMILY MEDICINE | Admitting: FAMILY MEDICINE
Payer: COMMERCIAL

## 2018-04-04 DIAGNOSIS — N92.6 IRREGULAR MENSTRUAL CYCLE: ICD-10-CM

## 2018-04-04 PROCEDURE — 76830 TRANSVAGINAL US NON-OB: CPT

## 2018-04-04 NOTE — PROGRESS NOTES
Notify Destiny that her blood tests for blood count and thyroid are perfect.  No concerns there.  We'll wait on her ultrasound.   Pam Del Castillo MD

## 2018-04-04 NOTE — TELEPHONE ENCOUNTER
Notes Recorded by Dara Arias on 4/4/2018 at 1:29 PM  Left message on machine to call back  Rain/GONZALEZ  ------    Notes Recorded by Pam Del Castillo MD on 4/4/2018 at 1:10 PM  Notify Destiny that her blood tests for blood count and thyroid are perfect.  No concerns there.  We'll wait on her ultrasound.   Pam Del Castillo MD

## 2018-05-10 ENCOUNTER — TELEPHONE (OUTPATIENT)
Dept: FAMILY MEDICINE | Facility: CLINIC | Age: 28
End: 2018-05-10

## 2018-05-11 ENCOUNTER — TRANSFERRED RECORDS (OUTPATIENT)
Dept: HEALTH INFORMATION MANAGEMENT | Facility: CLINIC | Age: 28
End: 2018-05-11

## 2018-05-11 ENCOUNTER — OFFICE VISIT (OUTPATIENT)
Dept: FAMILY MEDICINE | Facility: CLINIC | Age: 28
End: 2018-05-11
Payer: COMMERCIAL

## 2018-05-11 ENCOUNTER — APPOINTMENT (OUTPATIENT)
Dept: FAMILY MEDICINE | Facility: CLINIC | Age: 28
End: 2018-05-11
Payer: COMMERCIAL

## 2018-05-11 VITALS
OXYGEN SATURATION: 100 % | SYSTOLIC BLOOD PRESSURE: 100 MMHG | TEMPERATURE: 98.1 F | HEART RATE: 70 BPM | WEIGHT: 133 LBS | DIASTOLIC BLOOD PRESSURE: 62 MMHG | BODY MASS INDEX: 21.8 KG/M2

## 2018-05-11 DIAGNOSIS — R87.611 PAP SMEAR OF CERVIX WITH ASCUS, CANNOT EXCLUDE HGSIL: Primary | ICD-10-CM

## 2018-05-11 DIAGNOSIS — R87.810 CERVICAL HIGH RISK HPV (HUMAN PAPILLOMAVIRUS) TEST POSITIVE: ICD-10-CM

## 2018-05-11 DIAGNOSIS — N87.1 CERVICAL DYSPLASIA, MODERATE: ICD-10-CM

## 2018-05-11 LAB — HCG UR QL: NEGATIVE

## 2018-05-11 PROCEDURE — 88305 TISSUE EXAM BY PATHOLOGIST: CPT | Performed by: FAMILY MEDICINE

## 2018-05-11 PROCEDURE — 81025 URINE PREGNANCY TEST: CPT | Performed by: FAMILY MEDICINE

## 2018-05-11 PROCEDURE — 87624 HPV HI-RISK TYP POOLED RSLT: CPT | Performed by: FAMILY MEDICINE

## 2018-05-11 PROCEDURE — G0145 SCR C/V CYTO,THINLAYER,RESCR: HCPCS | Performed by: FAMILY MEDICINE

## 2018-05-11 PROCEDURE — 57456 ENDOCERV CURETTAGE W/SCOPE: CPT | Performed by: FAMILY MEDICINE

## 2018-05-11 ASSESSMENT — PAIN SCALES - GENERAL: PAINLEVEL: NO PAIN (0)

## 2018-05-11 NOTE — MR AVS SNAPSHOT
"              After Visit Summary   2018    Destiny Jade    MRN: [id number]           Patient Information     Date Of Birth          1990        Visit Information        Provider Department      2018 9:15 AM Pam Del Castillo MD [facility]        Today's Diagnoses     Pre-procedure lab exam    -  1    ASC-H Pap smear of cervix ( ASCUS, cannot exclude HGSIL)        Cervical high risk HPV # 16 (human papillomavirus) test positive           Follow-ups after your visit        Who to contact     If you have questions or need follow up information about today's clinic visit or your schedule please contact [facility] directly at [phone].  Normal or non-critical lab and imaging results will be communicated to you by MyChart, letter or phone within 4 business days after the clinic has received the results. If you do not hear from us within 7 days, please contact the clinic through MyChart or phone. If you have a critical or abnormal lab result, we will notify you by phone as soon as possible.  Submit refill requests through [employer] or call your pharmacy and they will forward the refill request to us. Please allow 3 business days for your refill to be completed.          Additional Information About Your Visit        MyChart Information     [employer] lets you send messages to your doctor, view your test results, renew your prescriptions, schedule appointments and more. To sign up, go to [email or web address] . Click on \"Log in\" on the left side of the screen, which will take you to the Welcome page. Then click on \"Sign up Now\" on the right side of the page.     You will be asked to enter the access code listed below, as well as some personal information. Please follow the directions to create your username and password.     Your access code is: [email or web address]  Expires: 2018 11:48 AM     Your access code will  in 90 days. If you need help or a new code, " please call your [facility] clinic or [phone].        Care EveryWhere ID     This is your Care EveryWhere ID. This could be used by other organizations to access your [facility] medical records  [id number]        Your Vitals Were     Pulse Temperature Last Period Pulse Oximetry Breastfeeding? BMI (Body Mass Index)    70 98.1  F (36.7  C) (Temporal) 04/20/2018 (Approximate) 100% No 21.8 kg/m2       Blood Pressure from Last 3 Encounters:   05/11/18 100/62   03/30/18 (!) 88/52   05/19/17 114/74    Weight from Last 3 Encounters:   05/11/18 133 lb (60.3 kg)   03/30/18 135 lb 3.2 oz (61.3 kg)   05/19/17 130 lb (59 kg)              We Performed the Following     HCG qualitative urine     HPV High Risk Types DNA Cervical     Pap imaged thin layer screen reflex to HPV if ASCUS - recommend age 25 - 29     Surgical pathology exam        Primary Care Provider Office Phone # Fax #    Mayito Hill [phone] [address] [place] [address]        Equal Access to Services     [facility] Center[facility]ACHELLE : Hadii phoenix saavedra hadashmurali Solloyd, waaxda luqadaha, qaybta kaalmaelian friend, conchis giles . So [facility] [place] [phone].    ATENCIÓN: Si habla español, tiene a street disposición servicios gratuitos de asistencia lingüística. [facility] [phone].    We comply with applicable federal civil rights laws and [place] laws. We do not discriminate on the basis of race, color, national origin, age, disability, sex, sexual orientation, or gender identity.            Thank you!     Thank you for choosing [facility]  for your care. Our goal is always to provide you with excellent care. Hearing back from our patients is one way we can continue to improve our services. Please take a few minutes to complete the written survey that you may receive in the mail after your visit with us. Thank you!             Your Updated Medication List - Protect others around you: Learn how to safely use,  store and throw away your medicines at www.disposemymeds.org.      Notice  As of 5/11/2018  9:52 AM    You have not been prescribed any medications.

## 2018-05-11 NOTE — PROGRESS NOTES
S:  Destiny Jade is a 27 year old female here for a colposcopy.   Her last pap smear:    Lab Results   Component Value Date    PAP ASC-H 05/01/2017   She also tested positive for HPV 16 at that time.       Previous paps normal:yes as follows:  Lab Results   Component Value Date          PAP NIL 04/22/2014    PAP NIL 10/08/2013       Cervical risk factors: Tobacco Use no      Lifetime sexual partners: 10      Previous STD none      Previous dysplasia: yes PAPO 2 may 2017      Previous colposcopy: yes:  date may 2017.       Previous treatment:  None. LEEP was offered at that time but patient chose conservative follow up.       Age at first intercourse: 15  Current birth control: none  Patient's last menstrual period was 04/20/2018 (approximate).  I discussed the history of HPV and the risk of dysplasia/cancer.  I explained the indications for the colposcopy and the procedure in detail.  I discussed the potential risks including bleeding, infection and cramping. I have recommend abstinence for 1 weeks and no vigorous activity for 48 hours.  Consent form was signed by patient and all questions were answered.    O:  Vitals noted.  Patient alert, oriented, and in no acute distress. She was placed on the exam table in the dorsal position and a sterile speculum inserted into the vagina. The cervix was easily visualized.  Pap smear collected.  Acetic acid was applied to better visualize the transformation zone.  Colposcopy was performed in the usual fashion.  No biopsy taken, cervix normal, and an ECC was performed.  Please see scanned colposcopy form for details and findings.    A:  Colposcopy of the cervix and upper/adjacent vagina with ECC.    CIN2 on prior colposcopy.     P:  Will await pathology results, if no dysplasia, repeat pap in 12 months and if ECC positive, recommend LEEP. Discussed that if her pap is discordant, will need to consider diagnostic LEEP.     Discussed symptoms to watch for, including bleeding  through 1 pad or more per hour, heavy cramps or abdominal pain, fever, or purulent foul smelling discharge.  If these occur, then she will call or return for follow up.    Pam Del Castillo MD

## 2018-05-15 LAB
COPATH REPORT: NORMAL
COPATH REPORT: NORMAL
PAP: NORMAL

## 2018-05-16 ENCOUNTER — TELEPHONE (OUTPATIENT)
Dept: FAMILY MEDICINE | Facility: CLINIC | Age: 28
End: 2018-05-16

## 2018-05-16 NOTE — TELEPHONE ENCOUNTER
The patient called back and information has been given.   Thank you,  Elizabeth Lopez   for [facility]

## 2018-05-16 NOTE — TELEPHONE ENCOUNTER
----- Message from Pam Del Castillo MD sent at 5/16/2018  1:06 AM CDT -----  Await HPV.  You can let her know that her biopsy is all normal. Pap is normal, just waiting on HPV.   Pam Del Castillo MD

## 2018-05-16 NOTE — PROGRESS NOTES
Await HPV.  You can let her know that her biopsy is all normal. Pap is normal, just waiting on HPV.   Pam Del Castillo MD

## 2018-05-17 LAB
FINAL DIAGNOSIS: ABNORMAL
HPV HR 12 DNA CVX QL NAA+PROBE: NEGATIVE
HPV16 DNA SPEC QL NAA+PROBE: POSITIVE
HPV18 DNA SPEC QL NAA+PROBE: NEGATIVE
SPECIMEN DESCRIPTION: ABNORMAL
SPECIMEN SOURCE CVX/VAG CYTO: ABNORMAL

## 2018-05-23 NOTE — PROGRESS NOTES
We can tell her that her Pap was normal, her biopsy was normal, but she is still positive for HPV.  She will need another pap smear in 1 year, but this is all good news, things have improved since last year.   Pam Del Castillo MD

## 2018-11-10 ENCOUNTER — HOSPITAL ENCOUNTER (EMERGENCY)
Facility: CLINIC | Age: 28
Discharge: HOME | End: 2018-11-10
Attending: EMERGENCY MEDICINE | Admitting: EMERGENCY MEDICINE
Payer: COMMERCIAL

## 2018-11-10 ENCOUNTER — APPOINTMENT (OUTPATIENT)
Dept: GENERAL RADIOLOGY | Facility: CLINIC | Age: 28
End: 2018-11-10
Attending: EMERGENCY MEDICINE
Payer: COMMERCIAL

## 2018-11-10 VITALS
OXYGEN SATURATION: 100 % | HEART RATE: 66 BPM | RESPIRATION RATE: 20 BRPM | TEMPERATURE: 98.7 F | SYSTOLIC BLOOD PRESSURE: 129 MMHG | DIASTOLIC BLOOD PRESSURE: 87 MMHG

## 2018-11-10 DIAGNOSIS — S93.402A SPRAIN OF LEFT ANKLE, UNSPECIFIED LIGAMENT, INITIAL ENCOUNTER: ICD-10-CM

## 2018-11-10 DIAGNOSIS — S92.252A CLOSED AVULSION FRACTURE OF NAVICULAR BONE OF LEFT FOOT, INITIAL ENCOUNTER: ICD-10-CM

## 2018-11-10 PROCEDURE — 99283 EMERGENCY DEPT VISIT LOW MDM: CPT | Performed by: EMERGENCY MEDICINE

## 2018-11-10 PROCEDURE — 99283 EMERGENCY DEPT VISIT LOW MDM: CPT | Mod: Z6 | Performed by: EMERGENCY MEDICINE

## 2018-11-10 PROCEDURE — 73610 X-RAY EXAM OF ANKLE: CPT | Mod: TC,LT

## 2018-11-10 NOTE — ED AVS SNAPSHOT
[facility] Emergency Department    [address] [place] [address]    Phone:  [phone]    Fax:  [phone]                                       Destiny Jade   MRN: [id number]    Department:  [facility] Emergency Department   Date of Visit:  11/10/2018           After Visit Summary Signature Page     I have received my discharge instructions, and my questions have been answered. I have discussed any challenges I see with this plan with the nurse or doctor.    ..........................................................................................................................................  Patient/Patient Representative Signature      ..........................................................................................................................................  Patient Representative Print Name and Relationship to Patient    ..................................................               ................................................  Date                                   Time    ..........................................................................................................................................  Reviewed by Signature/Title    ...................................................              ..............................................  Date                                               Time          22EPIC Rev 08/18

## 2018-11-10 NOTE — LETTER
November 10, 2018      To Whom It May Concern:      Destiny Jade was seen in our Emergency Department today, 11/10/18.  I expect her condition to improve over the next 3 days.  She may return to work/school when improved.    Sincerely,        Gabe Ventura MD

## 2018-11-10 NOTE — ED AVS SNAPSHOT
[facility] Emergency Department    [address] [place] [address]    Phone:  [phone]    Fax:  [phone]                                       Destiny Jade   MRN: [id number]    Department:  [facility] Emergency Department   Date of Visit:  11/10/2018           Patient Information     Date Of Birth          1990        Your diagnoses for this visit were:     Sprain of left ankle, unspecified ligament, initial encounter     Closed avulsion fracture of navicular bone of left foot, initial encounter        You were seen by Gabe Ventura MD.      Follow-up Information     Follow up with Maiyto Hill MD In 3 days.    Specialty:  Family Practice    Why:  If symptoms worsen, ER follow up    Contact information:    [address] [place] [address]  [phone]          Discharge Instructions         Understanding Ankle Sprain    The ankle is the joint where the leg and foot meet. Bones are held in place by connective tissue called ligaments. When ankle ligaments are stretched to the point of pain and injury, it is called an ankle sprain. A sprain can tear the ligaments. These tears can be very small but still cause pain. Ankle sprains can be mild or severe.  What causes an ankle sprain?  A sprain may occur when you twist your ankle or bend it too far. This can happen when you stumble or fall. Things that can make an ankle sprain more likely include:    Having had an ankle sprain before    Playing sports that involve running and jumping. Or playing contact sports such as football or hockey.    Wearing shoes that don t support your feet and ankles well    Having ankles with poor strength and flexibility  Symptoms of an ankle sprain  Symptoms may include:    Pain or soreness in the ankle    Swelling    Redness or bruising    Not being able to walk or put weight on the affected foot    Reduced range of motion in the ankle    A popping or tearing feeling at the time  the sprain occurs    An abnormal or dislocated look to the ankle    Instability or too much range of motion in the ankle  Treatment for an ankle sprain  Treatment focuses on reducing pain and swelling, and avoiding further injury. Treatments may include:    Resting the ankle. Avoid putting weight on it. This may mean using crutches until the sprain heals.    Prescription or over-the-counter pain medicines. These help reduce swelling and pain.    Cold packs. These help reduce pain and swelling.    Raising your ankle above your heart. This helps reduce swelling.    Wrapping the ankle with an elastic bandage or ankle brace. This helps reduce swelling and gives some support to the ankle. In rare cases, you may need a cast or boot.    Stretching and other exercises. These improve flexibility and strength.    Heat packs. These may be recommended before doing ankle exercises.  Possible complications of an ankle sprain  An ankle that has been weakened by a sprain can be more likely to have repeated sprains afterward. Doing exercises to strengthen your ankle and improve balance can reduce your risk for repeated sprains. Other possible complications are long-term (chronic) pain or an ankle that remains unstable.  When to call your healthcare provider  Call your healthcare provider right away if you have any of these:    Fever of 100.4 F (38 C) or higher, or as directed    Pain, numbness, discoloration, or coldness in the foot or toes    Pain that gets worse    Symptoms that don t get better, or get worse    New symptoms   Date Last Reviewed: 3/10/2016    3933-6065 [employer]. [address] [place] [address]. All rights reserved. This information is not intended as a substitute for professional medical care. Always follow your healthcare professional's instructions.          24 Hour Appointment Hotline       To make an appointment at any [facility], call [phone] ([phone]). If you  "don't have a family doctor or clinic, we will help you find one. [facility] clinics are conveniently located to serve the needs of you and your family.          ED Discharge Orders     Aircast                    Review of your medicines      Notice     You have not been prescribed any medications.            Procedures and tests performed during your visit     XR Ankle Left G/E 3 Views      Orders Needing Specimen Collection     None      Pending Results     No orders found from 11/8/2018 to 11/11/2018.            Pending Culture Results     No orders found from 11/8/2018 to 11/11/2018.            Pending Results Instructions     If you had any lab results that were not finalized at the time of your Discharge, you can call the ED Lab Result RN at [phone]. You will be contacted by this team for any positive Lab results or changes in treatment. The nurses are available 7 days a week from 10A to 6:30P.  You can leave a message 24 hours per day and they will return your call.        Thank you for choosing [facility]       Thank you for choosing [facility] for your care. Our goal is always to provide you with excellent care. Hearing back from our patients is one way we can continue to improve our services. Please take a few minutes to complete the written survey that you may receive in the mail after you visit with us. Thank you!        Quidsihart Information     [employer]ify lets you send messages to your doctor, view your test results, renew your prescriptions, schedule appointments and more. To sign up, go to [email or web address] . Click on \"Log in\" on the left side of the screen, which will take you to the Welcome page. Then click on \"Sign up Now\" on the right side of the page.     You will be asked to enter the access code listed below, as well as some personal information. Please follow the directions to create your username and password.     Your access code is: [email or web address]  Expires: 2/8/2019  7:47 PM     Your access " code will  in 90 days. If you need help or a new code, please call your [facility] clinic or [phone].        Care EveryWhere ID     This is your Care EveryWhere ID. This could be used by other organizations to access your [facility] medical records  [id number]        Equal Access to Services     NHUNG LOVE : Salomon vallejoo Solloyd, waaxda luqadaha, qaybta kaalmada phuc, conchis ramon. So [facility] [phone].    ATENCIÓN: Si habla español, tiene a street disposición servicios gratuitos de asistencia lingüística. Llame al [phone].    We comply with applicable federal civil rights laws and [place] laws. We do not discriminate on the basis of race, color, national origin, age, disability, sex, sexual orientation, or gender identity.            After Visit Summary       This is your record. Keep this with you and show to your community pharmacist(s) and doctor(s) at your next visit.

## 2018-11-11 NOTE — DISCHARGE INSTRUCTIONS
Understanding Ankle Sprain    The ankle is the joint where the leg and foot meet. Bones are held in place by connective tissue called ligaments. When ankle ligaments are stretched to the point of pain and injury, it is called an ankle sprain. A sprain can tear the ligaments. These tears can be very small but still cause pain. Ankle sprains can be mild or severe.  What causes an ankle sprain?  A sprain may occur when you twist your ankle or bend it too far. This can happen when you stumble or fall. Things that can make an ankle sprain more likely include:    Having had an ankle sprain before    Playing sports that involve running and jumping. Or playing contact sports such as football or hockey.    Wearing shoes that don t support your feet and ankles well    Having ankles with poor strength and flexibility  Symptoms of an ankle sprain  Symptoms may include:    Pain or soreness in the ankle    Swelling    Redness or bruising    Not being able to walk or put weight on the affected foot    Reduced range of motion in the ankle    A popping or tearing feeling at the time the sprain occurs    An abnormal or dislocated look to the ankle    Instability or too much range of motion in the ankle  Treatment for an ankle sprain  Treatment focuses on reducing pain and swelling, and avoiding further injury. Treatments may include:    Resting the ankle. Avoid putting weight on it. This may mean using crutches until the sprain heals.    Prescription or over-the-counter pain medicines. These help reduce swelling and pain.    Cold packs. These help reduce pain and swelling.    Raising your ankle above your heart. This helps reduce swelling.    Wrapping the ankle with an elastic bandage or ankle brace. This helps reduce swelling and gives some support to the ankle. In rare cases, you may need a cast or boot.    Stretching and other exercises. These improve flexibility and strength.    Heat packs. These may be recommended before doing  ankle exercises.  Possible complications of an ankle sprain  An ankle that has been weakened by a sprain can be more likely to have repeated sprains afterward. Doing exercises to strengthen your ankle and improve balance can reduce your risk for repeated sprains. Other possible complications are long-term (chronic) pain or an ankle that remains unstable.  When to call your healthcare provider  Call your healthcare provider right away if you have any of these:    Fever of 100.4 F (38 C) or higher, or as directed    Pain, numbness, discoloration, or coldness in the foot or toes    Pain that gets worse    Symptoms that don t get better, or get worse    New symptoms   Date Last Reviewed: 3/10/2016    6406-5235 [employer]. [address] [place] [address], [place] [address]. All rights reserved. This information is not intended as a substitute for professional medical care. Always follow your healthcare professional's instructions.

## 2018-11-11 NOTE — ED PROVIDER NOTES
"  History     Chief Complaint   Patient presents with     Ankle Pain     The history is provided by the patient.     Destiny Jade is a 28 year old female who presents to the emergency department with complaints of left ankle pain. The patient slipped while she was running down the stairs this morning around 0715. She says that her left foot rolled inwards and \"underneath me\". She is having pain on the lateral part of her ankle. She took some Ibuprofen when the injury happened this morning. She was able to go to work and walk around on her foot.    Problem List:    Patient Active Problem List    Diagnosis Date Noted     Cervical high risk HPV # 16 (human papillomavirus) test positive 05/19/2017     Priority: Medium     ASC-H Pap smear of cervix ( ASCUS, cannot exclude HGSIL) 05/01/2017     Priority: Medium     2013 & 2014 NIL paps  5/1/17 ASC-H pap @ 27 yo. Plan: colp bef 8/1/17 5/19/17 colp PAPO 2, + HPV # 16. Plan: LEEP bef 8/19/17 03/13/18 LEEP not done, chart updated for LEEP/6mo pap. ([facility])   5/11/18: [facility] benign NIL pap, + HR HPV type 16 result. Plan pap in 1 year per provider.       CARDIOVASCULAR SCREENING; LDL GOAL LESS THAN 160 04/22/2014     Priority: Medium        Past Medical History:    Past Medical History:   Diagnosis Date     ASC-H Pap smear of cervix ( ASCUS, cannot exclude HGSIL) 5/1/2017     Cervical high risk HPV (human papillomavirus) test positive 05/11/2018     H/O colposcopy with cervical biopsy 05/19/2017       Past Surgical History:    Past Surgical History:   Procedure Laterality Date     NO HISTORY OF SURGERY         Family History:    Family History   Problem Relation Age of Onset     Hypertension Father      Breast Cancer Maternal Grandmother      Obesity Maternal Grandfather      Diabetes Paternal Grandmother      Obesity Paternal Grandmother      Cancer Paternal Grandfather      Lung Cancer Paternal Grandfather      Bladder Cancer Paternal Grandfather        Social " History:  Marital Status:   [2]  Social History   Substance Use Topics     Smoking status: Former Smoker     Start date: 4/19/2014     Smokeless tobacco: Never Used     Alcohol use 3.0 oz/week     5 Standard drinks or equivalent per week      Comment: 5 per week        Medications:      No current outpatient prescriptions on file.      Review of Systems   All other systems reviewed and are negative.      Physical Exam   BP: 129/87  Pulse: 66  Temp: 98.2  F (36.8  C)  Resp: 20  SpO2: 100 %      Physical Exam   Constitutional: She is oriented to person, place, and time. She appears well-developed and well-nourished. No distress.   HENT:   Head: Normocephalic and atraumatic.   Eyes: No scleral icterus.   Neck: Normal range of motion. Neck supple.   Musculoskeletal: She exhibits no edema or deformity.   ttp over lateral left ankle over distal fibula and distal distal to the fibula. Pain increased with range of motion.   Neurological: She is alert and oriented to person, place, and time.   Skin: Skin is warm and dry. No rash noted. She is not diaphoretic. No erythema. No pallor.   Psychiatric: She has a normal mood and affect. Her behavior is normal.       ED Course     ED Course     Procedures                 Results for orders placed or performed during the hospital encounter of 11/10/18 (from the past 24 hour(s))   XR Ankle Left G/E 3 Views    Narrative    LEFT ANKLE THREE OR MORE VIEWS     11/10/2018 7:35 PM     HISTORY: Ankle pain/twisted ankle.    COMPARISON: None.      Impression    IMPRESSION: Three views left ankle. No evidence of dislocation. No  significant soft tissue swelling. Mortise joint appears symmetric.  Small calcaneal heel spur is noted. Well-corticated bony fragment is  noted superior to the navicular on the lateral view possibly related  to an old traumatic injury. Correlate with area of patient's pain to  exclude the possibility of a small avulsion fracture. Accessory  ossicle or  degenerative osteophyte remain in the differential.         Medications - No data to display    Assessments & Plan (with Medical Decision Making)  Ankle sprain with possible small acute avulsion fracture of the navicular.  The patient was put in a air gel splint and did well.  Return to ER precautions discussed.  Recommend ice, rest, elevation, ibuprofen, splint.     I have reviewed the nursing notes.    I have reviewed the findings, diagnosis, plan and need for follow up with the patient.      New Prescriptions    No medications on file       Final diagnoses:   None     This document serves as a record of services personally performed by Gabe Ventura MD. It was created on their behalf by Lamar Lakhani, a trained medical scribe. The creation of this record is based on the provider's personal observations and the statements of the patient. This document has been checked and approved by the attending provider.  Note: Chart documentation done in part with Dragon Voice Recognition software. Although reviewed after completion, some word and grammatical errors may remain.  11/10/2018   [facility] EMERGENCY DEPARTMENT     Gabe Ventura MD  11/11/18 0257

## 2019-03-08 ENCOUNTER — OFFICE VISIT (OUTPATIENT)
Dept: FAMILY MEDICINE | Facility: CLINIC | Age: 29
End: 2019-03-08
Payer: COMMERCIAL

## 2019-03-08 VITALS
HEART RATE: 90 BPM | HEIGHT: 66 IN | WEIGHT: 127.7 LBS | TEMPERATURE: 98.1 F | DIASTOLIC BLOOD PRESSURE: 80 MMHG | SYSTOLIC BLOOD PRESSURE: 126 MMHG | RESPIRATION RATE: 22 BRPM | BODY MASS INDEX: 20.52 KG/M2 | OXYGEN SATURATION: 100 %

## 2019-03-08 DIAGNOSIS — Z11.3 SCREEN FOR STD (SEXUALLY TRANSMITTED DISEASE): ICD-10-CM

## 2019-03-08 DIAGNOSIS — N94.6 DYSMENORRHEA: ICD-10-CM

## 2019-03-08 DIAGNOSIS — R87.611 PAP SMEAR OF CERVIX WITH ASCUS, CANNOT EXCLUDE HGSIL: ICD-10-CM

## 2019-03-08 DIAGNOSIS — R87.810 CERVICAL HIGH RISK HPV (HUMAN PAPILLOMAVIRUS) TEST POSITIVE: ICD-10-CM

## 2019-03-08 DIAGNOSIS — Z00.00 WELL ADULT EXAM: Primary | ICD-10-CM

## 2019-03-08 LAB
SPECIMEN SOURCE: NORMAL
WET PREP SPEC: NORMAL

## 2019-03-08 PROCEDURE — 87491 CHLMYD TRACH DNA AMP PROBE: CPT | Performed by: NURSE PRACTITIONER

## 2019-03-08 PROCEDURE — 88175 CYTOPATH C/V AUTO FLUID REDO: CPT | Performed by: NURSE PRACTITIONER

## 2019-03-08 PROCEDURE — 87210 SMEAR WET MOUNT SALINE/INK: CPT | Performed by: NURSE PRACTITIONER

## 2019-03-08 PROCEDURE — 87591 N.GONORRHOEAE DNA AMP PROB: CPT | Performed by: NURSE PRACTITIONER

## 2019-03-08 PROCEDURE — 99395 PREV VISIT EST AGE 18-39: CPT | Performed by: NURSE PRACTITIONER

## 2019-03-08 PROCEDURE — 87624 HPV HI-RISK TYP POOLED RSLT: CPT | Performed by: NURSE PRACTITIONER

## 2019-03-08 RX ORDER — LEVONORGESTREL/ETHIN.ESTRADIOL 0.1-0.02MG
1 TABLET ORAL DAILY
Qty: 84 TABLET | Refills: 4 | Status: SHIPPED | OUTPATIENT
Start: 2019-03-08 | End: 2021-02-19

## 2019-03-08 ASSESSMENT — MIFFLIN-ST. JEOR: SCORE: 1313.05

## 2019-03-08 ASSESSMENT — ENCOUNTER SYMPTOMS
PALPITATIONS: 0
JOINT SWELLING: 0
PARESTHESIAS: 0
HEMATOCHEZIA: 0
COUGH: 0
DIZZINESS: 0
CONSTIPATION: 0
CHILLS: 0
HEARTBURN: 0
FEVER: 0
BREAST MASS: 0
NERVOUS/ANXIOUS: 0
HEMATURIA: 0
SHORTNESS OF BREATH: 0
NAUSEA: 0
MYALGIAS: 0
FREQUENCY: 0
SORE THROAT: 0
WEAKNESS: 0
ARTHRALGIAS: 0
EYE PAIN: 0
DYSURIA: 0
HEADACHES: 0
DIARRHEA: 0
ABDOMINAL PAIN: 0

## 2019-03-08 ASSESSMENT — PAIN SCALES - GENERAL: PAINLEVEL: NO PAIN (0)

## 2019-03-08 NOTE — LETTER
March 12, 2019      Destiny MUSTAFA Yasmany  [address] [facility] [address]        Dear ,    We are writing to inform you of your test results.    The STD screen is negative     Resulted Orders   NEISSERIA GONORRHOEA PCR   Result Value Ref Range    Specimen Descrip Cervix     N Gonorrhea PCR Negative NEG^Negative      Comment:      Negative for N. gonorrhoeae rRNA by transcription mediated amplification.  A negative result by transcription mediated amplification does not preclude   the presence of N. gonorrhoeae infection because results are dependent on   proper and adequate collection, absence of inhibitors, and sufficient rRNA to   be detected.     CHLAMYDIA TRACHOMATIS PCR   Result Value Ref Range    Specimen Description Cervix     Chlamydia Trachomatis PCR Negative NEG^Negative      Comment:      Negative for C. trachomatis rRNA by transcription mediated amplification.  A negative result by transcription mediated amplification does not preclude   the presence of C. trachomatis infection because results are dependent on   proper and adequate collection, absence of inhibitors, and sufficient rRNA to   be detected.     Wet prep   Result Value Ref Range    Specimen Description Vagina     Wet Prep Many  PMNs seen       Wet Prep No Trichomonas seen     Wet Prep No clue cells seen     Wet Prep No yeast seen        If you have any questions or concerns, please call the clinic at the number listed above.       Sincerely,        LOLI Bradshaw CNP

## 2019-03-08 NOTE — PATIENT INSTRUCTIONS

## 2019-03-08 NOTE — PROGRESS NOTES
Answers for HPI/ROS submitted by the patient on 3/8/2019   Annual Exam:  Frequency of exercise:: None  Getting at least 3 servings of Calcium per day:: Yes  Diet:: Regular (no restrictions)  Taking medications regularly:: Yes  Medication side effects:: None  Bi-annual eye exam:: Yes  Dental care twice a year:: NO  Sleep apnea or symptoms of sleep apnea:: None  Negative for the following: abdominal pain, Blood in stool, Blood in urine, chest pain, chills, congestion, constipation, cough, diarrhea, dizziness, ear pain, eye pain, nervous/anxious, fever, frequency, genital sores, headaches, hearing loss, heartburn, arthralgias, joint swelling, peripheral edema, mood changes, myalgias, nausea, dysuria, palpitations, Skin sensation changes, sore throat, urgency, rash, shortness of breath, visual disturbance, weakness  pelvic pain: No  vaginal bleeding: No  vaginal discharge: No  tenderness: No  breast mass: No  breast discharge: No  Additional concerns today:: No

## 2019-03-08 NOTE — PROGRESS NOTES
SUBJECTIVE:   CC: Destiny Jade is an 28 year old woman who presents for preventive health visit.     Physical   Annual:     Getting at least 3 servings of Calcium per day:  Yes    Bi-annual eye exam:  Yes    Dental care twice a year:  NO    Sleep apnea or symptoms of sleep apnea:  None    Diet:  Regular (no restrictions)    Frequency of exercise:  None    Taking medications regularly:  Yes    Medication side effects:  None    Additional concerns today:  No    PHQ-2 Total Score: 0              Today's PHQ-2 Score:   PHQ-2 ( 1999 Pf[employer]) 3/8/2019   Q1: Little interest or pleasure in doing things 0   Q2: Feeling down, depressed or hopeless 0   PHQ-2 Score 0   Q1: Little interest or pleasure in doing things Not at all   Q2: Feeling down, depressed or hopeless Not at all   PHQ-2 Score 0       Abuse: Current or Past(Physical, Sexual or Emotional)- No  Do you feel safe in your environment? Yes    Social History     Tobacco Use     Smoking status: Current Every Day Smoker     Packs/day: 0.50     Start date: 4/19/2014     Smokeless tobacco: Never Used   Substance Use Topics     Alcohol use: Yes     Alcohol/week: 3.0 oz     Types: 5 Standard drinks or equivalent per week     Comment: 5 per week     Alcohol Use 3/8/2019   If you drink alcohol do you typically have greater than 3 drinks per day OR greater than 7 drinks per week? No       Reviewed orders with patient.  Reviewed health maintenance and updated orders accordingly - Yes  BP Readings from Last 3 Encounters:   03/08/19 126/80   11/10/18 129/87   05/11/18 100/62    Wt Readings from Last 3 Encounters:   03/08/19 57.9 kg (127 lb 11.2 oz)   05/11/18 60.3 kg (133 lb)   03/30/18 61.3 kg (135 lb 3.2 oz)                    Mammogram not appropriate for this patient based on age.    Pertinent mammograms are reviewed under the imaging tab.  History of abnormal Pap smear:   Last 3 Pap and HPV Results:   PAP / HPV Latest Ref Rng & Units 5/11/2018 5/19/2017 5/1/2017   PAP -  NIL - ASC-H(A)   HPV 16 DNA NEG:Negative Positive(A) Positive(A) -   HPV 18 DNA NEG:Negative Negative Negative -   OTHER HR HPV NEG:Negative Negative Negative -     PAP / HPV Latest Ref Rng & Units 2018   PAP - NIL - ASC-H(A)   HPV 16 DNA NEG:Negative Positive(A) Positive(A) -   HPV 18 DNA NEG:Negative Negative Negative -   OTHER HR HPV NEG:Negative Negative Negative -     Reviewed and updated as needed this visit by clinical staff  Tobacco  Allergies  Meds  Med Hx  Surg Hx  Fam Hx  Soc Hx        Reviewed and updated as needed this visit by Provider        Past Medical History:   Diagnosis Date     ASC-H Pap smear of cervix ( ASCUS, cannot exclude HGSIL) 2017     Cervical high risk HPV (human papillomavirus) test positive 2018: See problem list.      H/O colposcopy with cervical biopsy 2017    PAPO 2      Past Surgical History:   Procedure Laterality Date     NO HISTORY OF SURGERY       Obstetric History       T2      L3     SAB0   TAB0   Ectopic0   Multiple0   Live Births2       # Outcome Date GA Lbr Aaron/2nd Weight Sex Delivery Anes PTL Lv   3 Term 16 40w2d 02:14 / 00:23 3.399 kg (7 lb 7.9 oz) F Vag-Spont EPI  STERLING      Apgar1:  9               Apgar5: 10   2 Term 11/10/14 40w4d 02:39 / 00:56 3.68 kg (8 lb 1.8 oz) F  INT        Name: Kelsea      Apgar1:  9                Apgar5: 9   1 Para 11 41w4d 05:23 / 01:30 3.315 kg (7 lb 4.9 oz) F IVD EPI N STERLING      Name: Radhika      Apgar1:  8                Apgar5: 9      Obstetric Comments   EDC 2016 based on est. LMP.  (Will have ultrasound to confirm dating.)   to Jared.         Review of Systems   Constitutional: Negative for chills and fever.   HENT: Negative for congestion, ear pain, hearing loss and sore throat.    Eyes: Negative for pain and visual disturbance.   Respiratory: Negative for cough and shortness of breath.    Cardiovascular: Negative for chest pain, palpitations  "and peripheral edema.   Gastrointestinal: Negative for abdominal pain, constipation, diarrhea, heartburn, hematochezia and nausea.   Breasts:  Negative for tenderness, breast mass and discharge.   Genitourinary: Negative for dysuria, frequency, genital sores, hematuria, pelvic pain, urgency, vaginal bleeding and vaginal discharge.   Musculoskeletal: Negative for arthralgias, joint swelling and myalgias.   Skin: Negative for rash.   Neurological: Negative for dizziness, weakness, headaches and paresthesias.   Psychiatric/Behavioral: Negative for mood changes. The patient is not nervous/anxious.         female: no unusual urinary symptoms, no unusual vaginal symptoms and menses: dysmenorrhea     OBJECTIVE:   /80   Pulse 90   Temp 98.1  F (36.7  C) (Temporal)   Resp 22   Ht 1.664 m (5' 5.5\")   Wt 57.9 kg (127 lb 11.2 oz)   SpO2 100%   BMI 20.93 kg/m    Physical Exam  GENERAL: healthy, alert and no distress  EYES: Eyes grossly normal to inspection, PERRL and conjunctivae and sclerae normal  HENT: ear canals and TM's normal, nose and mouth without ulcers or lesions  NECK: no adenopathy, no asymmetry, masses, or scars and thyroid normal to palpation  RESP: lungs clear to auscultation - no rales, rhonchi or wheezes  BREAST: normal without masses, tenderness or nipple discharge and no palpable axillary masses or adenopathy  CV: regular rate and rhythm, normal S1 S2, no S3 or S4, no murmur, click or rub, no peripheral edema and peripheral pulses strong  ABDOMEN: soft, nontender, no hepatosplenomegaly, no masses and bowel sounds normal   (female): normal female external genitalia, normal urethral meatus, vaginal mucosa pink, moist, well rugated, and normal cervix/adnexa/uterus without masses or discharge  MS: no gross musculoskeletal defects noted, no edema  SKIN: no suspicious lesions or rashes  NEURO: Normal strength and tone, mentation intact and speech normal  PSYCH: mentation appears normal, affect " "normal/bright        ASSESSMENT/PLAN:       ICD-10-CM    1. Well adult exam Z00.00 Pap imaged thin layer diagnostic with HPV (select HPV order below)     HPV High Risk Types DNA Cervical     Wet prep   2. Dysmenorrhea N94.6 levonorgestrel-ethinyl estradiol (AVIANE/ALESSE/LESSINA) 0.1-20 MG-MCG tablet   3. Screen for STD (sexually transmitted disease) Z11.3 NEISSERIA GONORRHOEA PCR     CHLAMYDIA TRACHOMATIS PCR   4. Cervical high risk HPV # 16 (human papillomavirus) test positive R87.810        COUNSELING:  Reviewed preventive health counseling, as reflected in patient instructions       Regular exercise       Healthy diet/nutrition       Vision screening       Contraception       Osteoporosis Prevention/Bone Health    BP Readings from Last 1 Encounters:   03/08/19 126/80     Estimated body mass index is 20.93 kg/m  as calculated from the following:    Height as of this encounter: 1.664 m (5' 5.5\").    Weight as of this encounter: 57.9 kg (127 lb 11.2 oz).    BP Screening:   Last 3 BP Readings:    BP Readings from Last 3 Encounters:   03/08/19 126/80   11/10/18 129/87   05/11/18 100/62       The following was recommended to the patient:  Re-screen BP within a year and recommended lifestyle modifications       reports that she has been smoking.  She started smoking about 4 years ago. She has been smoking about 0.50 packs per day. she has never used smokeless tobacco.  Tobacco Cessation Action Plan: Information offered: Patient not interested at this time    Counseling Resources:  ATP IV Guidelines  Pooled Cohorts Equation Calculator  Breast Cancer Risk Calculator  FRAX Risk Assessment  ICSI Preventive Guidelines  Dietary Guidelines for Americans, 2010  USDA's MyPlate  ASA Prophylaxis  Lung CA Screening    LOLI Bradshaw CNP  [facility]  "

## 2019-03-10 LAB
C TRACH DNA SPEC QL NAA+PROBE: NEGATIVE
N GONORRHOEA DNA SPEC QL NAA+PROBE: NEGATIVE
SPECIMEN SOURCE: NORMAL
SPECIMEN SOURCE: NORMAL

## 2019-03-12 LAB
COPATH REPORT: NORMAL
PAP: NORMAL

## 2019-03-18 ENCOUNTER — TELEPHONE (OUTPATIENT)
Dept: FAMILY MEDICINE | Facility: CLINIC | Age: 29
End: 2019-03-18

## 2019-03-18 NOTE — TELEPHONE ENCOUNTER
Reason for Call:  Form, our goal is to have forms completed with 72 hours, however, some forms may require a visit or additional information.    Type of letter, form or note:  work     Who is the form from?: Patient    Where did the form come from: Patient or family brought in       What clinic location was the form placed at?: [facility]    Where the form was placed: 's Box    What number is listed as a contact on the form?: [phone]       Additional comments: Call patient when completed and ready for .        Call taken on 3/18/2019 at 3:55 PM by Charissa Schmidt

## 2019-04-11 ENCOUNTER — OFFICE VISIT (OUTPATIENT)
Dept: URGENT CARE | Facility: RETAIL CLINIC | Age: 29
End: 2019-04-11
Payer: COMMERCIAL

## 2019-04-11 VITALS
DIASTOLIC BLOOD PRESSURE: 73 MMHG | HEART RATE: 79 BPM | TEMPERATURE: 100.2 F | SYSTOLIC BLOOD PRESSURE: 119 MMHG | OXYGEN SATURATION: 100 %

## 2019-04-11 DIAGNOSIS — J10.1 INFLUENZA A: ICD-10-CM

## 2019-04-11 DIAGNOSIS — R05.9 COUGH: Primary | ICD-10-CM

## 2019-04-11 LAB
FLUAV AG UPPER RESP QL IA.RAPID: ABNORMAL
FLUBV AG UPPER RESP QL IA.RAPID: ABNORMAL

## 2019-04-11 PROCEDURE — 99213 OFFICE O/P EST LOW 20 MIN: CPT | Performed by: NURSE PRACTITIONER

## 2019-04-11 PROCEDURE — 87804 INFLUENZA ASSAY W/OPTIC: CPT | Performed by: NURSE PRACTITIONER

## 2019-04-11 NOTE — PROGRESS NOTES
SUBJECTIVE:   Destiny Jade is a 28 year old female presenting with a chief complaint of   Chief Complaint   Patient presents with     Cough     started last night     Fever     Generalized Body Aches       She is an established patient of [facility].    UR Adult    Onset of symptoms was 2 week(s) ago.  Course of illness is waxing and waning.    Severity moderate  Current and Associated symptoms: fever, chills, runny nose, stuffy nose, cough - non-productive, sore throat, headache, body aches, fatigue and nausea  Treatment measures tried include OTC Cough med, Fluids and Rest.   Predisposing factors include ill contact: Family member .  2 kids with flu like symptoms last week   Good uo, no chest pain, is a smoker but, asthma hx  Had flu shot last fall     Review of Systems  Constitutional, eye, ENT, skin, respiratory, cardiac, GI, MSK, neuro, and allergy are normal except as otherwise noted.   Past Medical History:   Diagnosis Date     ASC-H Pap smear of cervix ( ASCUS, cannot exclude HGSIL) 5/1/2017     Cervical high risk HPV (human papillomavirus) test positive 05/11/2018 05/11/18: See problem list.      H/O colposcopy with cervical biopsy 05/19/2017    PAPO 2     Family History   Problem Relation Age of Onset     Hypertension Father      Breast Cancer Maternal Grandmother      Obesity Maternal Grandfather      Diabetes Paternal Grandmother      Obesity Paternal Grandmother      Cancer Paternal Grandfather      Lung Cancer Paternal Grandfather      Bladder Cancer Paternal Grandfather      Current Outpatient Medications   Medication Sig Dispense Refill     levonorgestrel-ethinyl estradiol (AVIANE/ALESSE/LESSINA) 0.1-20 MG-MCG tablet Take 1 tablet by mouth daily 84 tablet 4     Social History     Tobacco Use     Smoking status: Current Every Day Smoker     Packs/day: 0.50     Start date: 4/19/2014     Smokeless tobacco: Never Used   Substance Use Topics     Alcohol use: Yes     Alcohol/week: 3.0 oz     Types: 5  Standard drinks or equivalent per week     Comment: 5 per week       OBJECTIVE  /73   Pulse 79   Temp 100.2  F (37.9  C) (Oral)   SpO2 100%     Physical Exam  GENERAL: mildly ill appearing, alert and in no distress  EYES: Eyes grossly normal to inspection, no discharge. Extraocular movements - intact, and PERRL  HENT: Normocephalic, ear canals- normal; TMs- normal ; Nose- normal with clear rhinnorhea; oropharynx clear without erythema or exudates other than clear pnd, Mouth- no ulcers, no lesions and mucous membranes moist  NECK: no tenderness, no adenopathy, no asymmetry, no masses, no stiffness  RESP: lungs clear to auscultation - no rales, no rhonchi, no wheezes  CV: regular rates and rhythm, normal S1 S2, no S3 or S4 and no murmur, no click or rub - peripheral pulses normal and brisk cap refill   ABDOMEN: soft, no tenderness, no hepatosplenomegaly, no masses, normal bowel sounds  MS: extremities- no gross deformities noted, no edema  SKIN: no suspicious lesions, no rashes, good skin turgor  NEURO: strength and tone- normal, sensory exam- grossly normal, mentation appears normal     Labs:  Results for orders placed or performed in visit on 04/11/19 (from the past 24 hour(s))   INFLUENZA A/B ANTIGEN   Result Value Ref Range    Influenza A pos neg    Influenza B neg neg       X-Ray was not done.    ASSESSMENT/PLAN:      ICD-10-CM    1. Cough R05 INFLUENZA A/B ANTIGEN   2. Influenza A J10.1    Discussed viral versus bacterial illnesses along with typical course of progression, and no signs or symptoms of bacterial infection at this point. Reviewed flu and no tamiflu indicated   Symptom management: gargle salt water, honey in tea or warm water, plenty of rest and extra fluids. Reviewed signs of dehydration when to seek care        Followup:    If not improving or if condition worsens, follow up with your Primary Care Provider    Patient Instructions   Drink plenty of extra fluids, honey soothes the cough  (honey and lemon in tea is great), and cold fluids help keep swelling down.     Gargle salt water a few times a day, some studies show this can kill the virus sooner. Chicken noodle soup, and good nutrition in small amounts. Vitamin C and Zinc can help boost your immune system and can be taken short term while you fight this off. Extra fluids and rest also help your body fight off the virus.     Do saline washes with neti pot or saline nasal spray a few times throughout the day    Over the counter decongestants you can try:   Oxymetazolone nasal spray  (Afrin or generic is fine):  two sprays twice daily for 3-4 days then stop to avoid rebound congestion.    OR  Pseudoephedrine: 30mg three times daily by mouth for 1-2 weeks. You need to go to the Pharmacist counter and show your ID to get this medication. Do not take if you have high blood pressure, and this medication may also keep you awake.     I would also recommend to thin mucous:  Guaifenesin regularly until you are better    For pain and discomfort:  Ibuprofen 400 - 600mg three times daily for 5-7 days (anti-inflammatory) to decrease swelling and help with pain of sinuses, nose, throat and chest. If you have Diabetes or Kidney issues, do not take this or any other NSAID     Return to clinic at any time if you develop high fevers, if worsening or in 1-2 weeks if not improving.       Diya ENNIS CNP

## 2019-04-11 NOTE — PATIENT INSTRUCTIONS
Drink plenty of extra fluids, honey soothes the cough (honey and lemon in tea is great), and cold fluids help keep swelling down.     Gargle salt water a few times a day, some studies show this can kill the virus sooner. Chicken noodle soup, and good nutrition in small amounts. Vitamin C and Zinc can help boost your immune system and can be taken short term while you fight this off. Extra fluids and rest also help your body fight off the virus.     Do saline washes with neti pot or saline nasal spray a few times throughout the day    Over the counter decongestants you can try:   Oxymetazolone nasal spray  (Afrin or generic is fine):  two sprays twice daily for 3-4 days then stop to avoid rebound congestion.    OR  Pseudoephedrine: 30mg three times daily by mouth for 1-2 weeks. You need to go to the Pharmacist counter and show your ID to get this medication. Do not take if you have high blood pressure, and this medication may also keep you awake.     I would also recommend to thin mucous:  Guaifenesin regularly until you are better    For pain and discomfort:  Ibuprofen 400 - 600mg three times daily for 5-7 days (anti-inflammatory) to decrease swelling and help with pain of sinuses, nose, throat and chest. If you have Diabetes or Kidney issues, do not take this or any other NSAID     Return to clinic at any time if you develop high fevers, if worsening or in 1-2 weeks if not improving.

## 2019-05-31 ENCOUNTER — TELEPHONE (OUTPATIENT)
Dept: FAMILY MEDICINE | Facility: CLINIC | Age: 29
End: 2019-05-31

## 2019-05-31 DIAGNOSIS — Z30.41 SURVEILLANCE OF PREVIOUSLY PRESCRIBED CONTRACEPTIVE PILL: ICD-10-CM

## 2019-05-31 RX ORDER — DROSPIRENONE AND ETHINYL ESTRADIOL 0.03MG-3MG
1 KIT ORAL DAILY
Qty: 28 TABLET | Refills: 11 | Status: SHIPPED | OUTPATIENT
Start: 2019-05-31 | End: 2021-02-19

## 2019-05-31 NOTE — TELEPHONE ENCOUNTER
Reason for Call:  Medication or medication refill:    Do you use a [facility]?  Name of the pharmacy and phone [facility] for the [facility] request[facility] [phone]    Name of the medication requested: Ocella    Other request: patient was seen by another provider and was put on a different birth control, patient would like to switch back to the one she was on originally,please call to advise      Can we leave a detailed message on this number? YES    Phone number patient can be reached at: Cell number on file:    Telephone Information:   Mobile [phone]       Best Time: any    Call taken on 5/31/2019 at 10:14 AM by Katlyn Alexander

## 2019-10-07 ENCOUNTER — TELEPHONE (OUTPATIENT)
Dept: FAMILY MEDICINE | Facility: CLINIC | Age: 29
End: 2019-10-07

## 2019-10-07 DIAGNOSIS — R87.810 CERVICAL HIGH RISK HPV (HUMAN PAPILLOMAVIRUS) TEST POSITIVE: ICD-10-CM

## 2019-10-07 DIAGNOSIS — R87.611 PAP SMEAR OF CERVIX WITH ASCUS, CANNOT EXCLUDE HGSIL: ICD-10-CM

## 2019-10-07 NOTE — TELEPHONE ENCOUNTER
Pt is past due for f/u pap smear if declining recommended colposcopy.  [facility] clinic and schedule.    Nancy Luque  Pap Tracking

## 2019-11-06 ENCOUNTER — TELEPHONE (OUTPATIENT)
Dept: FAMILY MEDICINE | Facility: CLINIC | Age: 29
End: 2019-11-06

## 2019-11-06 NOTE — TELEPHONE ENCOUNTER
I have attempted to contact this patient by phone with the following results: left message to return my call on answering machine.      Pt was recommended to get the LEEP procedure done and hasnt scheduled this. Does she want to get this scheduled? If so, check with Deept[facility] to see where to schedule this. [facility] doesn't do LEEPs but [facility] does.  Mikala Doran, [facility]

## 2021-01-09 ENCOUNTER — HEALTH MAINTENANCE LETTER (OUTPATIENT)
Age: 31
End: 2021-01-09

## 2021-02-19 ENCOUNTER — OFFICE VISIT (OUTPATIENT)
Dept: FAMILY MEDICINE | Facility: CLINIC | Age: 31
End: 2021-02-19
Payer: COMMERCIAL

## 2021-02-19 VITALS
HEIGHT: 66 IN | OXYGEN SATURATION: 100 % | SYSTOLIC BLOOD PRESSURE: 110 MMHG | DIASTOLIC BLOOD PRESSURE: 60 MMHG | TEMPERATURE: 97.6 F | RESPIRATION RATE: 18 BRPM | HEART RATE: 94 BPM | BODY MASS INDEX: 20.41 KG/M2 | WEIGHT: 127 LBS

## 2021-02-19 DIAGNOSIS — Z00.00 ROUTINE GENERAL MEDICAL EXAMINATION AT A HEALTH CARE FACILITY: Primary | ICD-10-CM

## 2021-02-19 PROCEDURE — 99395 PREV VISIT EST AGE 18-39: CPT | Performed by: FAMILY MEDICINE

## 2021-02-19 ASSESSMENT — ENCOUNTER SYMPTOMS
DIZZINESS: 0
MYALGIAS: 0
PARESTHESIAS: 0
NERVOUS/ANXIOUS: 0
HEADACHES: 0
NAUSEA: 0
FREQUENCY: 0
COUGH: 0
ABDOMINAL PAIN: 0
WEAKNESS: 0
PALPITATIONS: 0
JOINT SWELLING: 0
CONSTIPATION: 0
HEMATURIA: 0
SHORTNESS OF BREATH: 0
EYE PAIN: 0
DYSURIA: 0
DIARRHEA: 0
ARTHRALGIAS: 0
FEVER: 0
BREAST MASS: 0
SORE THROAT: 0
CHILLS: 0
HEMATOCHEZIA: 0
HEARTBURN: 0

## 2021-02-19 ASSESSMENT — PAIN SCALES - GENERAL: PAINLEVEL: NO PAIN (0)

## 2021-02-19 ASSESSMENT — MIFFLIN-ST. JEOR: SCORE: 1299.88

## 2021-02-19 NOTE — PROGRESS NOTES
SUBJECTIVE:   CC: Destiny Jade is an 30 year old woman who presents for preventive health visit.       Patient has been advised of split billing requirements and indicates understanding: Yes  Healthy Habits:     Getting at least 3 servings of Calcium per day:  Yes    Bi-annual eye exam:  Yes    Dental care twice a year:  NO    Sleep apnea or symptoms of sleep apnea:  None    Diet:  Regular (no restrictions)    Frequency of exercise:  None    Taking medications regularly:  Yes    Medication side effects:  None    PHQ-2 Total Score: 0    Additional concerns today:  No              Today's PHQ-2 Score:   PHQ-2 ( 1999 Pfizer) 2/19/2021   Q1: Little interest or pleasure in doing things 0   Q2: Feeling down, depressed or hopeless 0   PHQ-2 Score 0   Q1: Little interest or pleasure in doing things Not at all   Q2: Feeling down, depressed or hopeless Not at all   PHQ-2 Score 0       Abuse: Current or Past (Physical, Sexual or Emotional) - No  Do you feel safe in your environment? Yes    Have you ever done Advance Care Planning? (For example, a Health Directive, POLST, or a discussion with a medical provider or your loved ones about your wishes): No, advance care planning information given to patient to review.  Patient plans to discuss their wishes with loved ones or provider.      Social History     Tobacco Use     Smoking status: Current Every Day Smoker     Packs/day: 0.50     Start date: 4/19/2014     Smokeless tobacco: Never Used   Substance Use Topics     Alcohol use: Yes     Alcohol/week: 5.0 standard drinks     Types: 5 Standard drinks or equivalent per week     Comment: 5 per week     If you drink alcohol do you typically have >3 drinks per day or >7 drinks per week? No    Alcohol Use 2/19/2021   Prescreen: >3 drinks/day or >7 drinks/week? No   Prescreen: >3 drinks/day or >7 drinks/week? -   No flowsheet data found.    Any new diagnosis of family breast, ovarian, or bowel cancer? Yes     Reviewed orders with  patient.  Reviewed health maintenance and updated orders accordingly - No  BP Readings from Last 3 Encounters:   02/19/21 110/60   04/11/19 119/73   03/08/19 126/80    Wt Readings from Last 3 Encounters:   02/19/21 57.6 kg (127 lb)   03/08/19 57.9 kg (127 lb 11.2 oz)   05/11/18 60.3 kg (133 lb)                  Patient Active Problem List   Diagnosis     CARDIOVASCULAR SCREENING; LDL GOAL LESS THAN 160     ASC-H Pap smear of cervix ( ASCUS, cannot exclude HGSIL)     Cervical high risk HPV # 16 (human papillomavirus) test positive     Past Surgical History:   Procedure Laterality Date     NO HISTORY OF SURGERY         Social History     Tobacco Use     Smoking status: Current Every Day Smoker     Packs/day: 0.50     Start date: 4/19/2014     Smokeless tobacco: Never Used   Substance Use Topics     Alcohol use: Yes     Alcohol/week: 5.0 standard drinks     Types: 5 Standard drinks or equivalent per week     Comment: 5 per week     Family History   Problem Relation Age of Onset     Hypertension Father      Breast Cancer Maternal Grandmother      Obesity Maternal Grandfather      Diabetes Paternal Grandmother      Obesity Paternal Grandmother      Cancer Paternal Grandfather      Lung Cancer Paternal Grandfather      Bladder Cancer Paternal Grandfather          No current outpatient medications on file.       Breast CA Risk Screening:  No flowsheet data found.      Patient under 40 years of age: Routine Mammogram Screening not recommended.   Pertinent mammograms are reviewed under the imaging tab.    History of abnormal Pap smear: NO - age 30- 65 PAP every 3 years recommended  PAP / HPV Latest Ref Rng & Units 3/8/2019 5/11/2018 5/19/2017   PAP - NIL NIL -   HPV 16 DNA NEG:Negative Positive(A) Positive(A) Positive(A)   HPV 18 DNA NEG:Negative Negative Negative Negative   OTHER HR HPV NEG:Negative Negative Negative Negative     Reviewed and updated as needed this visit by clinical staff  Tobacco  Allergies  Meds        "       Reviewed and updated as needed this visit by Provider                    /60   Pulse 94   Temp 97.6  F (36.4  C) (Temporal)   Resp 18   Ht 1.664 m (5' 5.5\")   Wt 57.6 kg (127 lb)   LMP 02/04/2021 (Approximate)   SpO2 100%   BMI 20.81 kg/m    Physical Exam  GENERAL: healthy, alert and no distress  NECK: no adenopathy, no asymmetry, masses, or scars and thyroid normal to palpation  RESP: lungs clear to auscultation - no rales, rhonchi or wheezes  CV: regular rate and rhythm, normal S1 S2, no S3 or S4, no murmur, click or rub, no peripheral edema and peripheral pulses strong  ABDOMEN: soft, nontender, no hepatosplenomegaly, no masses and bowel sounds normal  MS: no gross musculoskeletal defects noted, no edema        ASSESSMENT/PLAN:   1. Routine general medical examination at a health care facility  For female she does not need any medications or laboratory studies      Patient has been advised of split billing requirements and indicates understanding: Yes  COUNSELING:  Reviewed preventive health counseling, as reflected in patient instructions       Regular exercise       Healthy diet/nutrition    Estimated body mass index is 20.81 kg/m  as calculated from the following:    Height as of this encounter: 1.664 m (5' 5.5\").    Weight as of this encounter: 57.6 kg (127 lb).    Weight management plan: Discussed healthy diet and exercise guidelines    She reports that she has been smoking. She started smoking about 6 years ago. She has been smoking about 0.50 packs per day. She has never used smokeless tobacco.  Tobacco Cessation Action Plan:         Counseling Resources:  ATP IV Guidelines  Pooled Cohorts Equation Calculator  Breast Cancer Risk Calculator  BRCA-Related Cancer Risk Assessment: FHS-7 Tool  FRAX Risk Assessment  ICSI Preventive Guidelines  Dietary Guidelines for Americans, 2010  USDA's MyPlate  ASA Prophylaxis  Lung CA Screening    Mayito Hill MD  [facility] " [place]

## 2021-10-23 ENCOUNTER — HEALTH MAINTENANCE LETTER (OUTPATIENT)
Age: 31
End: 2021-10-23

## 2022-03-22 ENCOUNTER — HOSPITAL ENCOUNTER (OUTPATIENT)
Dept: ULTRASOUND IMAGING | Facility: CLINIC | Age: 32
Discharge: HOME | End: 2022-03-22
Attending: FAMILY MEDICINE | Admitting: FAMILY MEDICINE
Payer: COMMERCIAL

## 2022-03-22 ENCOUNTER — OFFICE VISIT (OUTPATIENT)
Dept: FAMILY MEDICINE | Facility: CLINIC | Age: 32
End: 2022-03-22
Payer: COMMERCIAL

## 2022-03-22 ENCOUNTER — MYC MEDICAL ADVICE (OUTPATIENT)
Dept: FAMILY MEDICINE | Facility: CLINIC | Age: 32
End: 2022-03-22

## 2022-03-22 VITALS
WEIGHT: 131 LBS | DIASTOLIC BLOOD PRESSURE: 68 MMHG | HEART RATE: 82 BPM | TEMPERATURE: 98.5 F | RESPIRATION RATE: 18 BRPM | BODY MASS INDEX: 21.05 KG/M2 | OXYGEN SATURATION: 98 % | SYSTOLIC BLOOD PRESSURE: 112 MMHG | HEIGHT: 66 IN

## 2022-03-22 DIAGNOSIS — R10.9 FLANK PAIN: ICD-10-CM

## 2022-03-22 DIAGNOSIS — Z12.4 CERVICAL CANCER SCREENING: ICD-10-CM

## 2022-03-22 DIAGNOSIS — Z11.59 ENCOUNTER FOR HEPATITIS C SCREENING TEST FOR LOW RISK PATIENT: ICD-10-CM

## 2022-03-22 DIAGNOSIS — Z84.1: Primary | ICD-10-CM

## 2022-03-22 DIAGNOSIS — Z84.1: ICD-10-CM

## 2022-03-22 PROBLEM — M22.2X9 PATELLOFEMORAL SYNDROME: Status: ACTIVE | Noted: 2022-03-22

## 2022-03-22 PROBLEM — M25.569 PAIN IN JOINT, LOWER LEG: Status: ACTIVE | Noted: 2022-03-22

## 2022-03-22 PROBLEM — M79.10 MUSCLE PAIN: Status: ACTIVE | Noted: 2022-03-22

## 2022-03-22 LAB
ALBUMIN UR-MCNC: NEGATIVE MG/DL
ANION GAP SERPL CALCULATED.3IONS-SCNC: 5 MMOL/L (ref 3–14)
APPEARANCE UR: CLEAR
BILIRUB UR QL STRIP: NEGATIVE
BUN SERPL-MCNC: 11 MG/DL (ref 7–30)
CALCIUM SERPL-MCNC: 8.7 MG/DL (ref 8.5–10.1)
CHLORIDE BLD-SCNC: 106 MMOL/L (ref 94–109)
CO2 SERPL-SCNC: 28 MMOL/L (ref 20–32)
COLOR UR AUTO: ABNORMAL
CREAT SERPL-MCNC: 0.68 MG/DL (ref 0.52–1.04)
ERYTHROCYTE [DISTWIDTH] IN BLOOD BY AUTOMATED COUNT: 12.3 % (ref 10–15)
GFR SERPL CREATININE-BSD FRML MDRD: >90 ML/MIN/1.73M2
GLUCOSE BLD-MCNC: 89 MG/DL (ref 70–99)
GLUCOSE UR STRIP-MCNC: NEGATIVE MG/DL
HCT VFR BLD AUTO: 41.1 % (ref 35–47)
HCV AB SERPL QL IA: NONREACTIVE
HGB BLD-MCNC: 13.9 G/DL (ref 11.7–15.7)
HGB UR QL STRIP: ABNORMAL
KETONES UR STRIP-MCNC: NEGATIVE MG/DL
LEUKOCYTE ESTERASE UR QL STRIP: NEGATIVE
MCH RBC QN AUTO: 31.3 PG (ref 26.5–33)
MCHC RBC AUTO-ENTMCNC: 33.8 G/DL (ref 31.5–36.5)
MCV RBC AUTO: 93 FL (ref 78–100)
NITRATE UR QL: NEGATIVE
PH UR STRIP: 7 [PH] (ref 5–7)
PLATELET # BLD AUTO: 181 10E3/UL (ref 150–450)
POTASSIUM BLD-SCNC: 3.7 MMOL/L (ref 3.4–5.3)
RBC # BLD AUTO: 4.44 10E6/UL (ref 3.8–5.2)
RBC URINE: 0 /HPF
SODIUM SERPL-SCNC: 139 MMOL/L (ref 133–144)
SP GR UR STRIP: 1 (ref 1–1.03)
SQUAMOUS EPITHELIAL: <1 /HPF
UROBILINOGEN UR STRIP-MCNC: NORMAL MG/DL
WBC # BLD AUTO: 6.4 10E3/UL (ref 4–11)
WBC URINE: 1 /HPF

## 2022-03-22 PROCEDURE — G0145 SCR C/V CYTO,THINLAYER,RESCR: HCPCS | Performed by: FAMILY MEDICINE

## 2022-03-22 PROCEDURE — 76770 US EXAM ABDO BACK WALL COMP: CPT

## 2022-03-22 PROCEDURE — 85027 COMPLETE CBC AUTOMATED: CPT | Performed by: FAMILY MEDICINE

## 2022-03-22 PROCEDURE — 36415 COLL VENOUS BLD VENIPUNCTURE: CPT | Performed by: FAMILY MEDICINE

## 2022-03-22 PROCEDURE — 86803 HEPATITIS C AB TEST: CPT | Performed by: FAMILY MEDICINE

## 2022-03-22 PROCEDURE — 87624 HPV HI-RISK TYP POOLED RSLT: CPT | Performed by: FAMILY MEDICINE

## 2022-03-22 PROCEDURE — 99213 OFFICE O/P EST LOW 20 MIN: CPT | Mod: 25 | Performed by: FAMILY MEDICINE

## 2022-03-22 PROCEDURE — 80048 BASIC METABOLIC PNL TOTAL CA: CPT | Performed by: FAMILY MEDICINE

## 2022-03-22 PROCEDURE — 99395 PREV VISIT EST AGE 18-39: CPT | Performed by: FAMILY MEDICINE

## 2022-03-22 PROCEDURE — 81001 URINALYSIS AUTO W/SCOPE: CPT | Performed by: FAMILY MEDICINE

## 2022-03-22 ASSESSMENT — ENCOUNTER SYMPTOMS
PARESTHESIAS: 0
WEAKNESS: 0
PALPITATIONS: 0
FEVER: 0
NERVOUS/ANXIOUS: 0
CONSTIPATION: 0
MYALGIAS: 0
BREAST MASS: 0
HEMATOCHEZIA: 0
CHILLS: 0
ARTHRALGIAS: 0
HEARTBURN: 0
FREQUENCY: 0
DIARRHEA: 0
DYSURIA: 0
DIZZINESS: 0
COUGH: 0
ABDOMINAL PAIN: 0
EYE PAIN: 0
HEMATURIA: 0
SHORTNESS OF BREATH: 0
SORE THROAT: 0
NAUSEA: 0
HEADACHES: 0
JOINT SWELLING: 0

## 2022-03-22 ASSESSMENT — PAIN SCALES - GENERAL: PAINLEVEL: NO PAIN (0)

## 2022-03-22 NOTE — PROGRESS NOTES
SUBJECTIVE:   CC: Destiny Jade is an 31 year old woman who presents for preventive health visit.   Patient has been advised of split billing requirements and indicates understanding: Yes  Healthy Habits:     Getting at least 3 servings of Calcium per day:  Yes    Bi-annual eye exam:  Yes    Dental care twice a year:  Yes    Sleep apnea or symptoms of sleep apnea:  None    Diet:  Regular (no restrictions)    Frequency of exercise:  None    Taking medications regularly:  Yes    Medication side effects:  Not applicable    PHQ-2 Total Score: 0    Additional concerns today:  Yes        Today's PHQ-2 Score:   PHQ-2 ( 1999 Pfizer) 3/22/2022   Q1: Little interest or pleasure in doing things 0   Q2: Feeling down, depressed or hopeless 0   PHQ-2 Score 0   PHQ-2 Total Score (12-17 Years)- Positive if 3 or more points; Administer PHQ-A if positive -   Q1: Little interest or pleasure in doing things Not at all   Q2: Feeling down, depressed or hopeless Not at all   PHQ-2 Score 0       Abuse: Current or Past (Physical, Sexual or Emotional) - No  Do you feel safe in your environment? Yes        Social History     Tobacco Use     Smoking status: Current Every Day Smoker     Packs/day: 0.50     Start date: 4/19/2014     Smokeless tobacco: Never Used   Substance Use Topics     Alcohol use: Yes     Alcohol/week: 5.0 standard drinks     Types: 5 Standard drinks or equivalent per week     Comment: 5 per week     If you drink alcohol do you typically have >3 drinks per day or >7 drinks per week? No    Alcohol Use 3/22/2022   Prescreen: >3 drinks/day or >7 drinks/week? No   Prescreen: >3 drinks/day or >7 drinks/week? -   No flowsheet data found.    Reviewed orders with patient.  Reviewed health maintenance and updated orders accordingly - Yes  BP Readings from Last 3 Encounters:   03/22/22 112/68   02/19/21 110/60   04/11/19 119/73    Wt Readings from Last 3 Encounters:   03/22/22 59.4 kg (131 lb)   02/19/21 57.6 kg (127 lb)   03/08/19  57.9 kg (127 lb 11.2 oz)                  Patient Active Problem List   Diagnosis     CARDIOVASCULAR SCREENING; LDL GOAL LESS THAN 160     ASC-H Pap smear of cervix ( ASCUS, cannot exclude HGSIL)     Cervical high risk HPV # 16 (human papillomavirus) test positive     Adjustment reaction with anxiety and depression     Muscle pain     Pain in joint, lower leg     Patellofemoral syndrome     Sprain and strain of knee and leg     Past Surgical History:   Procedure Laterality Date     NO HISTORY OF SURGERY         Social History     Tobacco Use     Smoking status: Current Every Day Smoker     Packs/day: 0.50     Start date: 4/19/2014     Smokeless tobacco: Never Used   Substance Use Topics     Alcohol use: Yes     Alcohol/week: 5.0 standard drinks     Types: 5 Standard drinks or equivalent per week     Comment: 5 per week     Family History   Problem Relation Age of Onset     Hypertension Father      Breast Cancer Maternal Grandmother      Obesity Maternal Grandfather      Diabetes Paternal Grandmother      Obesity Paternal Grandmother      Cancer Paternal Grandfather      Lung Cancer Paternal Grandfather      Bladder Cancer Paternal Grandfather          No current outpatient medications on file.       Breast Cancer Screening:    FHS-7:   Breast CA Risk Assessment (FHS-7) 3/22/2022   Did any of your first-degree relatives have breast or ovarian cancer? Unknown   Did any of your relatives have bilateral breast cancer? Unknown   Did any man in your family have breast cancer? No   Did any woman in your family have breast and ovarian cancer? Unknown   Did any woman in your family have breast cancer before age 50 y? Unknown   Do you have 2 or more relatives with breast and/or ovarian cancer? Yes   Do you have 2 or more relatives with breast and/or bowel cancer? Yes     click delete button to remove this line now  Patient under 40 years of age: Routine Mammogram Screening not recommended.   Pertinent mammograms are reviewed  under the imaging tab.    History of abnormal Pap smear: NO - age 30- 65 PAP every 3 years recommended  PAP / HPV Latest Ref Rng & Units 3/8/2019 5/11/2018 5/19/2017   PAP (Historical) - NIL NIL -   HPV16 NEG:Negative Positive(A) Positive(A) Positive(A)   HPV18 NEG:Negative Negative Negative Negative   HRHPV NEG:Negative Negative Negative Negative     Reviewed and updated as needed this visit by clinical staff    Allergies  Meds              Reviewed and updated as needed this visit by Provider                     Review of Systems   Constitutional: Negative for chills and fever.   HENT: Negative for congestion, ear pain, hearing loss and sore throat.    Eyes: Negative for pain and visual disturbance.   Respiratory: Negative for cough and shortness of breath.    Cardiovascular: Negative for chest pain, palpitations and peripheral edema.   Gastrointestinal: Negative for abdominal pain, constipation, diarrhea, heartburn, hematochezia and nausea.   Breasts:  Negative for tenderness, breast mass and discharge.   Genitourinary: Negative for dysuria, frequency, genital sores, hematuria, pelvic pain, urgency, vaginal bleeding and vaginal discharge.   Musculoskeletal: Negative for arthralgias, joint swelling and myalgias.   Skin: Negative for rash.   Neurological: Negative for dizziness, weakness, headaches and paresthesias.   Psychiatric/Behavioral: Negative for mood changes. The patient is not nervous/anxious.      CONSTITUTIONAL: NEGATIVE for fever, chills, change in weight  INTEGUMENTARU/SKIN: NEGATIVE for worrisome rashes, moles or lesions  EYES: NEGATIVE for vision changes or irritation  ENT: NEGATIVE for ear, mouth and throat problems  RESP: NEGATIVE for significant cough or SOB  BREAST: NEGATIVE for masses, tenderness or discharge  CV: NEGATIVE for chest pain, palpitations or peripheral edema  GI: NEGATIVE for nausea, abdominal pain, heartburn, or change in bowel habits  : NEGATIVE for unusual urinary or vaginal  "symptoms. Periods are regular.  MUSCULOSKELETAL: NEGATIVE for significant arthralgias or myalgia  NEURO: NEGATIVE for weakness, dizziness or paresthesias  PSYCHIATRIC: NEGATIVE for changes in mood or affect   RENAL: Patient been having a bilateral flank pain periods off and on for the last few years since beginning progressively worse sometimes will happen once a week sometimes it will happen 3-4 times a week when the pain happens become so intense for about 5 minutes she has a difficult time even moving.  She was just wondering what might be going on further questioning reveals she has family history of acute renal failure to wants are on the transplant waiting list.  I do feel it is time for a renal work-up to make sure she does not have some type of renal issue that is underlying this.  OBJECTIVE:   /68   Pulse 82   Temp 98.5  F (36.9  C) (Temporal)   Resp 18   Ht 1.666 m (5' 5.6\")   Wt 59.4 kg (131 lb)   LMP 03/18/2022   SpO2 98%   BMI 21.40 kg/m    Physical Exam  GENERAL: healthy, alert and no distress  EYES: Eyes grossly normal to inspection, PERRL and conjunctivae and sclerae normal  HENT: ear canals and TM's normal, nose and mouth without ulcers or lesions  NECK: no adenopathy, no asymmetry, masses, or scars and thyroid normal to palpation  RESP: lungs clear to auscultation - no rales, rhonchi or wheezes  CV: regular rate and rhythm, normal S1 S2, no S3 or S4, no murmur, click or rub, no peripheral edema and peripheral pulses strong  ABDOMEN: soft, nontender, no hepatosplenomegaly, no masses and bowel sounds normal  MS: no gross musculoskeletal defects noted, no edema  SKIN: no suspicious lesions or rashes  NEURO: Normal strength and tone, mentation intact and speech normal  PSYCH: mentation appears normal, affect normal/bright    Diagnostic Test Results:  Labs reviewed in Epic  Results for orders placed or performed in visit on 03/22/22 (from the past 24 hour(s))   CBC with platelets   Result " Value Ref Range    WBC Count 6.4 4.0 - 11.0 10e3/uL    RBC Count 4.44 3.80 - 5.20 10e6/uL    Hemoglobin 13.9 11.7 - 15.7 g/dL    Hematocrit 41.1 35.0 - 47.0 %    MCV 93 78 - 100 fL    MCH 31.3 26.5 - 33.0 pg    MCHC 33.8 31.5 - 36.5 g/dL    RDW 12.3 10.0 - 15.0 %    Platelet Count 181 150 - 450 10e3/uL   Basic metabolic panel  (Ca, Cl, CO2, Creat, Gluc, K, Na, BUN)   Result Value Ref Range    Sodium 139 133 - 144 mmol/L    Potassium 3.7 3.4 - 5.3 mmol/L    Chloride 106 94 - 109 mmol/L    Carbon Dioxide (CO2) 28 20 - 32 mmol/L    Anion Gap 5 3 - 14 mmol/L    Urea Nitrogen 11 7 - 30 mg/dL    Creatinine 0.68 0.52 - 1.04 mg/dL    Calcium 8.7 8.5 - 10.1 mg/dL    Glucose 89 70 - 99 mg/dL    GFR Estimate >90 >60 mL/min/1.73m2   UA with Microscopic reflex to Culture - lab collect    Specimen: Urine, NOS   Result Value Ref Range    Color Urine Straw Colorless, Straw, Light Yellow, Yellow    Appearance Urine Clear Clear    Glucose Urine Negative Negative mg/dL    Bilirubin Urine Negative Negative    Ketones Urine Negative Negative mg/dL    Specific Gravity Urine 1.002 (L) 1.003 - 1.035    Blood Urine Moderate (A) Negative    pH Urine 7.0 5.0 - 7.0    Protein Albumin Urine Negative Negative mg/dL    Urobilinogen Urine Normal Normal, 2.0 mg/dL    Nitrite Urine Negative Negative    Leukocyte Esterase Urine Negative Negative    RBC Urine 0 <=2 /HPF    WBC Urine 1 <=5 /HPF    Squamous Epithelials Urine <1 <=1 /HPF    Narrative    Urine Culture not indicated       ASSESSMENT/PLAN:   Destiny was seen today for physical.  She continues to smoke and does not come considering quitting at this time going through some stress of the divorce.      Diagnoses and all orders for this visit:    Family history of acute renal failure  All laboratory studies appear normal patient will be informed  -     CBC with platelets; Future  -     UA with Microscopic reflex to Culture - lab collect; Future  -     US Renal Complete; Future  -     Basic  "metabolic panel  (Ca, Cl, CO2, Creat, Gluc, K, Na, BUN); Future    Flank pain  I ordered the above laboratory studies and the ultrasound with a history of having renal failure and the flank pain that has been progressing over the past couple of years.  No history of trauma or anything that would cause this.  Otherwise the patient feels an tiptop shape takes no medications.  -     UA with Microscopic reflex to Culture - lab collect; Future  -     US Renal Complete; Future    Encounter for hepatitis C screening test for low risk patient  -     Hepatitis C antibody; Future    Is no concern from her laboratory studies of renal issues will wait for the ultrasound.  If all laboratory studies are negative we may have to consider it musculoskeletal and refer her to chiropractic care.      Patient has been advised of split billing requirements and indicates understanding: Yes     COUNSELING:  Reviewed preventive health counseling, as reflected in patient instructions       Regular exercise       Healthy diet/nutrition    Estimated body mass index is 21.4 kg/m  as calculated from the following:    Height as of this encounter: 1.666 m (5' 5.6\").    Weight as of this encounter: 59.4 kg (131 lb).        She reports that she has been smoking. She started smoking about 7 years ago. She has been smoking about 0.50 packs per day. She has never used smokeless tobacco.  Tobacco Cessation Action Plan:   Not interested       Counseling Resources:  ATP IV Guidelines  Pooled Cohorts Equation Calculator  Breast Cancer Risk Calculator  BRCA-Related Cancer Risk Assessment: FHS-7 Tool  FRAX Risk Assessment  ICSI Preventive Guidelines  Dietary Guidelines for Americans, 2010  USDA's MyPlate  ASA Prophylaxis  Lung CA Screening    Mayito Hill MD  [facility]  "

## 2022-03-23 NOTE — TELEPHONE ENCOUNTER
All of her labs yesterday were normal except she had a little bit of blood in her urine.  If she is having her menses that could explain that but there is really no other significant findings on the urine other than the moderate blood with dipping the urine.  She did not have significant number of red blood cells or white blood cells that would be suggestive of infection.    Electronically signed by:  Ramon Rich M.D.  3/23/2022

## 2022-03-24 LAB
BKR LAB AP GYN ADEQUACY: NORMAL
BKR LAB AP GYN INTERPRETATION: NORMAL
BKR LAB AP HPV REFLEX: NORMAL
BKR LAB AP LMP: NORMAL
BKR LAB AP PREVIOUS ABNORMAL: NORMAL
PATH REPORT.COMMENTS IMP SPEC: NORMAL
PATH REPORT.COMMENTS IMP SPEC: NORMAL
PATH REPORT.RELEVANT HX SPEC: NORMAL

## 2022-03-25 ENCOUNTER — MYC MEDICAL ADVICE (OUTPATIENT)
Dept: FAMILY MEDICINE | Facility: CLINIC | Age: 32
End: 2022-03-25
Payer: COMMERCIAL

## 2022-03-25 LAB
HUMAN PAPILLOMA VIRUS 16 DNA: POSITIVE
HUMAN PAPILLOMA VIRUS 18 DNA: NEGATIVE
HUMAN PAPILLOMA VIRUS FINAL DIAGNOSIS: ABNORMAL
HUMAN PAPILLOMA VIRUS OTHER HR: NEGATIVE

## 2022-03-28 ENCOUNTER — PATIENT OUTREACH (OUTPATIENT)
Dept: FAMILY MEDICINE | Facility: CLINIC | Age: 32
End: 2022-03-28
Payer: COMMERCIAL

## 2022-05-06 ENCOUNTER — OFFICE VISIT (OUTPATIENT)
Dept: FAMILY MEDICINE | Facility: CLINIC | Age: 32
End: 2022-05-06
Payer: COMMERCIAL

## 2022-05-06 VITALS
BODY MASS INDEX: 22.26 KG/M2 | TEMPERATURE: 97.5 F | DIASTOLIC BLOOD PRESSURE: 70 MMHG | WEIGHT: 136.25 LBS | RESPIRATION RATE: 16 BRPM | OXYGEN SATURATION: 100 % | SYSTOLIC BLOOD PRESSURE: 114 MMHG | HEART RATE: 87 BPM

## 2022-05-06 DIAGNOSIS — Z87.410 HISTORY OF CERVICAL DYSPLASIA: ICD-10-CM

## 2022-05-06 DIAGNOSIS — R87.810 CERVICAL HIGH RISK HPV (HUMAN PAPILLOMAVIRUS) TEST POSITIVE: Primary | ICD-10-CM

## 2022-05-06 PROBLEM — M25.569 PAIN IN JOINT, LOWER LEG: Status: RESOLVED | Noted: 2022-03-22 | Resolved: 2022-05-06

## 2022-05-06 PROBLEM — M22.2X9 PATELLOFEMORAL SYNDROME: Status: RESOLVED | Noted: 2022-03-22 | Resolved: 2022-05-06

## 2022-05-06 PROBLEM — M79.10 MUSCLE PAIN: Status: RESOLVED | Noted: 2022-03-22 | Resolved: 2022-05-06

## 2022-05-06 LAB — HCG UR QL: NEGATIVE

## 2022-05-06 PROCEDURE — 81025 URINE PREGNANCY TEST: CPT | Performed by: FAMILY MEDICINE

## 2022-05-06 PROCEDURE — 99212 OFFICE O/P EST SF 10 MIN: CPT | Mod: 25 | Performed by: FAMILY MEDICINE

## 2022-05-06 PROCEDURE — 88305 TISSUE EXAM BY PATHOLOGIST: CPT | Performed by: PATHOLOGY

## 2022-05-06 PROCEDURE — 57454 BX/CURETT OF CERVIX W/SCOPE: CPT | Performed by: FAMILY MEDICINE

## 2022-05-06 ASSESSMENT — PAIN SCALES - GENERAL: PAINLEVEL: NO PAIN (0)

## 2022-05-06 NOTE — PROCEDURES
Procedure note:    Indication:  HPV 16 positive with history of cervical dysplasia PAPO II    Before the procedure, she was educated about the nature of her findings and their implications as well as what need to be done. She was explained about the role of HPV and the natural history of its infection as well as ways to minimize future risk, the effect of HPV on the cervix, and treatment options available should they be indicated. Discussed with her in details about the procedure as well as its potential risks, including missed diagnoses, pain, infection and bleeding. All questions were answered before proceeding and the consent was obtained.      Time out performed.  Patient identified times three.  Name of procedure and location of the procedure to be done also identfied.    Patient was placed in a lithotomy position. The perenium was examed under the microscope with and without the green filter and it was normal.  No suspicious lesion noted.  A sterile speculum was then inserted in a usual manner and the cervix was fully visualized in 360 degrees.  The cervix was the exam under the microscope with and without the green filter, which showed no obvious concern lesions; it was completely normal. No bleeding.  Vinegar solution applied to the cervix for 2 minutes. Thin aceto white was note at 11o'clock position. Biopsy performed in a usual manner at 11:00 position and random biopsy at 1 o'clock position with no complication. Endocervical biopsy with curette performed and she tolerated these procedures well.  Pending for pathology. Munsil solution applied directly into the cervix and bleeding was minimal.  Patient toterated the procedure well. Post procedure care discussed and explained in details.  Instruct her with no tampon, intercourse or douching for 1 week.  Educate symptoms to call in or to follow up, including fever, severe abdominal, heavy bleeding or if she has any concern.  Tylenol or Ibuprofen as needed for  pain or cramping.  Call in if has any questions      Bimanual examination: was not done  Pap repeated?:  No  SCJ seen?:  yes  Endocervical speculum needed?:  No  ECC done?:  Yes - curettage and brush  Lugol's solution used?:  No  Satisfactory examination?:  yes    Vaginal vault: normal to cursory inspection   Urethra normal?:  yes  Labia normal?:  yes  Perineum normal?:  yes    FINDINGS:  Please see image   Cervix: no visible lesions and very mild acetowhitening noted at 11 o'clock position  Procedure: biopsies taken (not including ECC): 2.    Procedure summary: Patient tolerated procedure well     Assessment: Normal    Plan: Specimens labelled and sent to pathology. Will base further treatment on pathology findings.  Post biopsy instructions discussed this with patient in details; symptoms that need to be seen or call in discussed.  No intercourse, tampons or douching for a week.  We will let her know once the pathology report is available.  Continue with yearly Pap smear until instructed otherwise.  She felt comfortable with the plan and all of her questions were answered.    Edin Hernandez MD.

## 2022-05-06 NOTE — PROGRESS NOTES
Colposcopy     Destiny is here today for colposcopy with possible cervical and endocervical biopsy.  Her last pap smear 3/2022 month ago showed normal pap with positive for HR HPV 16.  She was recommended for colposcopy with possible cervical and endocervical biopsy.  Stated that she was well informed by the nursing staff about the procedure and has no concern about it today.  Had colposcopy once several years ago and therefore is aware of the procedure. No vaginal discharge.  Patient's last menstrual period was 04/24/2022 (approximate). No other concern today. Pap history as follow:   2013 & 2014 NIL paps   5/1/17 ASC-H pap @ 27 yo. Plan: colp bef 8/1/17 5/19/17 colp PAPO 2, + HPV # 16. Plan: LEEP bef 8/19/17 03/13/18 LEEP not done, chart updated for LEEP/6mo pap. ([facility])    5/11/18: [facility] benign NIL pap, + HR HPV # 16. Plan: cotest in 1 yr   3/8/19 NIL pap, + HR HPV # 16 (no 18 or other). Plan: colp.    11/5/19 Lost to follow-up for pap tracking   3/22/22 NIL pap, + HR HPV # 16. Plan: [facility] bef 6/22/22    Previous history of abnormal paps?: yes  History of cryotherapy (freezing)?: no  History of veneral diseases: no  Do you desire testing for any of these diseases? no  History of genital warts:  no  Visible warts now?  no  History of sexual abuse:  Denied  Personal Hx of Cancer? NO  Family Hx of Cancer? NO  SABINE Exposure?  NO  Previous Hysterectomy?  No  Other Gyn Surgery? none  Partner(s) with warts?: No    Referring Physician:  Dr. Hill  Reason for Colposcopy: recurrent HPPV 16 with history of PAPO II  Marital status:    Number of pregnancies:  3         Children:   3  Patient's last menstrual period was Patient's last menstrual period was 04/24/2022 (approximate).  Abnormal bleeding? No  Abnormal discharge? No    Problem list and histories reviewed & adjusted, as indicated.  Additional history: as documented    Past Medical History:   Diagnosis Date     Adjustment reaction with anxiety and depression  8/11/2011    Formatting of this note might be different from the original. postpartum     ASC-H Pap smear of cervix ( ASCUS, cannot exclude HGSIL) 5/1/2017     Cervical high risk HPV (human papillomavirus) test positive 05/11/2018 05/11/18: See problem list.      H/O colposcopy with cervical biopsy 05/19/2017    PAPO 2       Past Surgical History:   Procedure Laterality Date     NO HISTORY OF SURGERY          No current outpatient medications on file.          Allergies   Allergen Reactions     No Known Drug Allergies        ROS:  Constitutional, HEENT, cardiovascular, pulmonary, gi and gu systems are negative, except as otherwise noted.    OBJECTIVE:                                                      /70 (BP Location: Left arm, Patient Position: Sitting, Cuff Size: Adult Regular)   Pulse 87   Temp 97.5  F (36.4  C) (Temporal)   Resp 16   Wt 61.8 kg (136 lb 4 oz)   LMP 04/24/2022 (Approximate)   SpO2 100%   BMI 22.26 kg/m      GENERAL: healthy, alert and no distress   RESP: lungs clear to auscultation - no rales, rhonchi or wheezes  CV: regular rate and rhythm, no murmur.   (female): normal female external genitalia, vaginal mucosa.      Diagnostic Test Results:    Results for orders placed or performed in visit on 05/06/22   HCG Qual, Urine ([id number])     Status: Normal   Result Value Ref Range    hCG Urine Qualitative Negative Negative          ASSESSMENT/PLAN:                                                        ICD-10-CM    1. Cervical high risk HPV # 16 (human papillomavirus) test positive  R87.810 Colposcopy, with biopsy & curettage     SURGICAL PATHOLOGY EXAM   2. History of cervical dysplasia  Z87.410 Colposcopy, with biopsy & curettage     SURGICAL PATHOLOGY EXAM       Recent pap showed she has nl pap with high risk HPV 16.  Was positive for HR HPV 16 since 2017.  Cervical biopsy in 2017 showed PAPO II, never had LEEP.  I reviewed the medical record in details and discussed with her about  the significance of the findings.  I agreed with the recommendations of colposcopy with possible cervical biopsy.  Discussed with her in details colposcopy procedure and please see the procedural note for further details.  Also discussed about post procedural cares.  She was also educated about symptoms to call in or be seen.  All of her questions were answered.    Edin Vaughn Mai, MD  [facility]

## 2022-05-09 LAB
PATH REPORT.COMMENTS IMP SPEC: NORMAL
PATH REPORT.COMMENTS IMP SPEC: NORMAL
PATH REPORT.FINAL DX SPEC: NORMAL
PATH REPORT.GROSS SPEC: NORMAL
PATH REPORT.MICROSCOPIC SPEC OTHER STN: NORMAL
PATH REPORT.RELEVANT HX SPEC: NORMAL
PHOTO IMAGE: NORMAL

## 2022-05-11 ENCOUNTER — TELEPHONE (OUTPATIENT)
Dept: FAMILY MEDICINE | Facility: CLINIC | Age: 32
End: 2022-05-11
Payer: COMMERCIAL

## 2022-05-11 NOTE — TELEPHONE ENCOUNTER
----- Message from Ramon Rich MD sent at 5/11/2022  8:40 AM CDT -----  Can use a Thursday afternoon procedure time for this consultation.     Electronically signed by:  Ramon Rich M.D.  5/11/2022      ----- Message -----  From: Edin Hernandez MD  Sent: 5/10/2022   7:06 PM CDT  To: Ramon Rich MD, [facility]    Discussed result with patient over the phone.  Recommended follow-up for further evaluation, likely will need LEEP procedure.  All of her questions were answered.    Please arrange her to see Dr. Rich for LEEP consult.  thanks

## 2022-05-11 NOTE — TELEPHONE ENCOUNTER
Called and LM for patient to call back. Please help schedule patient for LEEP consult with Dr. Rich for 20 min in one of his procedure slots on a Thursday per Dr. Rich.     Laura Fong MA

## 2022-05-11 NOTE — TELEPHONE ENCOUNTER
----- Message from Edin Vaughn Mai, MD sent at 5/10/2022  7:06 PM CDT -----  Discussed result with patient over the phone.  Recommended follow-up for further evaluation, likely will need LEEP procedure.  All of her questions were answered.    Please arrange her to see Dr. Rich for LEEP consult.  thanks

## 2022-05-19 ENCOUNTER — OFFICE VISIT (OUTPATIENT)
Dept: FAMILY MEDICINE | Facility: CLINIC | Age: 32
End: 2022-05-19
Payer: COMMERCIAL

## 2022-05-19 VITALS
RESPIRATION RATE: 14 BRPM | DIASTOLIC BLOOD PRESSURE: 78 MMHG | HEART RATE: 78 BPM | WEIGHT: 136 LBS | SYSTOLIC BLOOD PRESSURE: 118 MMHG | BODY MASS INDEX: 22.22 KG/M2 | TEMPERATURE: 97.9 F | OXYGEN SATURATION: 98 %

## 2022-05-19 DIAGNOSIS — Z71.9 ENCOUNTER FOR CONSULTATION: Primary | ICD-10-CM

## 2022-05-19 DIAGNOSIS — Z23 NEED FOR HPV VACCINE: ICD-10-CM

## 2022-05-19 PROCEDURE — 99214 OFFICE O/P EST MOD 30 MIN: CPT | Mod: 25 | Performed by: FAMILY MEDICINE

## 2022-05-19 PROCEDURE — 90651 9VHPV VACCINE 2/3 DOSE IM: CPT | Performed by: FAMILY MEDICINE

## 2022-05-19 PROCEDURE — 90471 IMMUNIZATION ADMIN: CPT | Performed by: FAMILY MEDICINE

## 2022-05-19 RX ORDER — DIAZEPAM 5 MG
TABLET ORAL
Qty: 2 TABLET | Refills: 0 | Status: ON HOLD | OUTPATIENT
Start: 2022-05-19 | End: 2022-10-31

## 2022-05-19 ASSESSMENT — PAIN SCALES - GENERAL: PAINLEVEL: NO PAIN (0)

## 2022-05-19 NOTE — PROGRESS NOTES
SUBJECTIVE:    Patient is in for a consultation today for a LEEP procedure.      This is a 31 year old old female, patient of Dr. Hill's,  who referred her to me for a LEEP consultation for abnormal Pap smear and abnormal colposcopy with biopsy.      All of the patient's records were reviewed and noted as follows.  Patient has had abnormal Pap smear with Dr. Hill on 3/22/22 which showed a normal or nil Pap smear but she tested positive for HPV 16, negative for HPV 18 and negative for HPV HR other.  She has tested for HPV 16 as far back as 2018 but has always had normal Pap smears cytology.  A previous colposcopy with biopsies in 2017 was positive for PAPO-2 or moderate dysplasia at the 12 o'clock position.  Recommendation at that time was to not do a LEEP but she is to monitor with repeat Pap smear.  Another colposcopy in May 2018 was negative for endocervical curetting and no biopsies were done because of no abnormalities found on the colposcopy.  Her most recent colposcopy with Dr. Hernandez on 5/6/2022 showed negative endocervical curetting but the biopsies at 1:00 and 11:00 both showed HSIL or PAPO-3.    HISTORY:  Her age of first intercourse was <17.  Her number of sexual partners in lifetime is >3.      She has not had a history of any other sexually transmitted diseases including herpes, genital warts, Chlamydia or gonorrhea.       CURRENT SEXUAL HISTORY:  She has been with the same partner for several years now.  He is not the father of any of her children.    Her previous abnormal Pap smear was normal but had tested positive for HPV 16.  Her colposcopy May 6, 2022 showed HSIL PAPO-3 at 2 biopsy sites.      Past Medical History:   Diagnosis Date     Adjustment reaction with anxiety and depression 8/11/2011    Formatting of this note might be different from the original. postpartum     ASC-H Pap smear of cervix ( ASCUS, cannot exclude HGSIL) 5/1/2017     Cervical high risk HPV (human papillomavirus) test  positive 05/11/2018 05/11/18: See problem list.      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     Comment: [employer]   Tobacco Use     Smoking status: Current Every Day Smoker     Packs/day: 0.50     Start date: 4/19/2014     Smokeless tobacco: Never Used   Vaping Use     Vaping Use: Never used   Substance and Sexual Activity     Alcohol use: Yes     Alcohol/week: 5.0 standard drinks     Types: 5 Standard drinks or equivalent per week     Comment: 5 per week     Drug use: No     Sexual activity: Yes     Partners: Male     Birth control/protection: Pill   Other Topics Concern      Service Yes     Comment: [employer] - discharged April 2008     Blood Transfusions No     Caffeine Concern Yes     Comment: Advised not more than 200 mg/day     Occupational Exposure Yes     Comment: [employer] Hazards No     Sleep Concern No     Stress Concern No     Weight Concern No     Special Diet No     Back Care Yes     Comment: Work-related back sprain/strain lower back     Exercise No     Comment: Advised walking 30 minutes/day     Seat Belt Yes   Social History Narrative    Lives in [place] with , Jared and three daughters.        Family History   Problem Relation Age of Onset     Hypertension Father      Breast Cancer Maternal Grandmother      Obesity Maternal Grandfather      Diabetes Paternal Grandmother      Obesity Paternal Grandmother      Cancer Paternal Grandfather      Lung Cancer Paternal Grandfather      Bladder Cancer Paternal Grandfather        Drug and lactation database from the [facility] [employer] of Medicine:  http://toxnet.nlm.nih.gov/cgi-bin/sis/htmlgen?LACT      DISCUSSION:  We talked about the range of abnormality of the Pap smears ranging from normal all the way up to invasive cancer.  I drew out a diagram for her and described all of the stages; ASCUS, mild, moderate, severe dysplasia, carcinoma in situ and invasive cancer.  I explained to her where on that continuum her most recent test results place her.      I did have information on LEEP procedures that she had read through including the [employer] handout on LEEP (Loop electrical excision procedures).  We went through that in detail, showing her exactly how we remove the portion of the cervix that is abnormal and what techniques we use.  I will stain her cervix again with acetic acid and Lugol's solutions to better outline the area that is abnormal and do a directed LEEP to include those areas.  In the case of a cold knife cone biopsy, I use a scalpel to remove the area of abnormality instead of the wire loop to avoid the cautery artifact that can be present with the wire loop.  This is especially important if there is any concern for a lesion that may extend up into the endocervical canal or if there is CIS present and we need to be certain of clear margins.      We went through the risks and complications including heavy bleeding, severe cramping, incomplete removal of the abnormal tissue, narrowing or scarring of the cervix and the risk of infection.  Also, rare but possible complications include a weakening of the cervix that would cause incompetence during a future pregnancy, (more common a risk in the cold knife cone), and accidental cutting or injuring of normal tissue.      We talked about this being done as an outpatient in the clinic in the procedure room under local anesthesia with lidocaine versus outpatient in the surgery department under general anesthesia.  If a cold knife cone is indicated, then it  would need to be done in the OR under general anesthesia.    The patient had all of her questions answered to her satisfaction and had no further questions for me at this time.     ASSESSMENT:  Abnormal Pap smear with PAPO-3 at the 11 and 1:00 positions with normal cells from the endocervical curetting.    PLAN:     She is scheduled for a LEEP on July 21, 2022 at 2:40 PM.  She does get some anxiety so I did send her home with a prescription for Valium 5 mg tablets she can take 1 tablet 30 minutes prior to her the procedure and bring second tablet with her in case she needs to repeat it.  She will contact me if she has any further questions.    I did talk to the patient about starting the HPV vaccine since she has not been vaccinated yet.  Even though she has tested positive for HPV 16 for a number of years it is still worthwhile giving her the vaccination to help build that immunity and see if she can clear the virus on her own with the help of the vaccine and immune response.  She did get her first vaccine today and will get a second in 2 months.  Her current partner is 49 years of age so he has aged out from the vaccine.    30 minutes were spent with this patient during this consultation with over 50% spent in counseling regarding a LEEP procedure.    Electronically signed by:  Ramon Rich M.D.  5/19/2022     Answers for HPI/ROS submitted by the patient on 5/19/2022  What is the reason for your visit today? : Leep procedure consult  How many servings of fruits and vegetables do you eat daily?: 0-1  On average, how many sweetened beverages do you drink each day (Examples: soda, juice, sweet tea, etc.  Do NOT count diet or artificially sweetened beverages)?: 1  How many minutes a day do you exercise enough to make your heart beat faster?: 9 or less  How many days a week do you exercise enough to make your heart beat faster?: 3 or less  How many days per week do you miss taking your medication?: 0

## 2022-05-23 ENCOUNTER — PATIENT OUTREACH (OUTPATIENT)
Dept: FAMILY MEDICINE | Facility: CLINIC | Age: 32
End: 2022-05-23
Payer: COMMERCIAL

## 2022-08-03 ENCOUNTER — OFFICE VISIT (OUTPATIENT)
Dept: FAMILY MEDICINE | Facility: CLINIC | Age: 32
End: 2022-08-03
Payer: COMMERCIAL

## 2022-08-03 VITALS
SYSTOLIC BLOOD PRESSURE: 112 MMHG | WEIGHT: 133 LBS | OXYGEN SATURATION: 98 % | BODY MASS INDEX: 21.73 KG/M2 | DIASTOLIC BLOOD PRESSURE: 66 MMHG | HEART RATE: 86 BPM | RESPIRATION RATE: 18 BRPM | TEMPERATURE: 97.6 F

## 2022-08-03 DIAGNOSIS — N87.9 DYSPLASIA OF CERVIX: Primary | ICD-10-CM

## 2022-08-03 DIAGNOSIS — Z23 NEED FOR HPV VACCINATION: ICD-10-CM

## 2022-08-03 LAB — HCG UR QL: NEGATIVE

## 2022-08-03 PROCEDURE — 90471 IMMUNIZATION ADMIN: CPT | Performed by: FAMILY MEDICINE

## 2022-08-03 PROCEDURE — 90651 9VHPV VACCINE 2/3 DOSE IM: CPT | Performed by: FAMILY MEDICINE

## 2022-08-03 PROCEDURE — 81025 URINE PREGNANCY TEST: CPT | Performed by: FAMILY MEDICINE

## 2022-08-03 PROCEDURE — 88307 TISSUE EXAM BY PATHOLOGIST: CPT | Performed by: PATHOLOGY

## 2022-08-03 PROCEDURE — 57460 BX OF CERVIX W/SCOPE LEEP: CPT | Performed by: FAMILY MEDICINE

## 2022-08-03 ASSESSMENT — PAIN SCALES - GENERAL: PAINLEVEL: MILD PAIN (3)

## 2022-08-03 NOTE — PROGRESS NOTES
SUBJECTIVE:  Patient is in for a LEEP procedure today.  Please see my LEEP consultation note.      She has an abnormal Pap smear again.  Please reference previous note for details on that.     Patient did not take Valium an hour prior to the procedure.  She has no further questions this morning.     PROCEDURE:   Patient was placed in the lithotomy position.  Grounding pad was placed.  Side-angled coated speculum was placed into the vagina and cervix was visualized.  Acetic acid was used to remove any cervical mucus and discharge.  Lugol was used to stain the cervix.  An area of abnormality was noted around the 11 and 1 o'clock position.  The entire squamocolumnar junction was noted.      OBGyn Exam    The cervix was anesthetized with 1% lidocaine with epinephrine.  A total of 10 cc were used.  A 20 mm loop electrical wire was used to excise the area of abnormality.   I had to do a swipe from the bottom at 06:00 to complete the excision.  The 12 o'clock position of the original cervical biopsy was marked with suture and placed in a separate specimen jar followed by the second specimen being labeled at the 6:00 position and placed in another specimen jar.  She tolerated it quite well.        A 5 mm cautery ball was used to cauterize the entire base of the lesion and along the cut surface.      There is no active bleeding noted.  I did place some Monsel solution over this to ensure hemostasis.      ASSESSMENT:  Abnormal Pap smear, status post LEEP or loop electrical excisional procedure.      PLAN:   She is going to use ibuprofen 600 mg PO TID to QID with food PRN, #60 were given with one refill.  She is aware that she will have abnormal discharge for up to five to seven days and can have some cramping associated with this during the recovery period.  She will refrain from any intercourse or anything per vagina for the next 6 wks to decrease her risk of infection.  She will see me in two to three weeks to look at the  cervix and make sure healing is going well and there is no endocervical stenosis.  She will then have her first follow-up Pap smear in a year with .  She will contact me with any questions.    Electronically signed by:  Ramon Rich M.D.  8/3/2022    Patient did mention during her exam that she has been having worsening menses with heavy heavy bleeding for 6 to 7 days and cramping to the point where sometimes she feels like she is going to pass out.  Her period did start yesterday and her cramping is so bad today that even during our exam I could not tell if she was uncomfortable from the exam or from the cramping.  She confirmed that it was her menstrual cramps that were causing her to wince and not the LEEP procedure itself.  Vaginal bleeding was heavy but I was able to swab in order to do the LEEP procedure.  She is not interested in hormonal control of her menses and does not want an IUD or the Nexplanon.  I am going to refer her to Dr. Alegria for consultation.    She is also going to get her second HPV vaccine today.

## 2022-08-03 NOTE — PROGRESS NOTES
{PROVIDER CHARTING PREFERENCE:457501}    Subjective   Destiny is a 31 year old, presenting for the following health issues:  Leep      HPI     {SUPERLIST (Optional):183528}  {additonal problems for provider to add (Optional):820586}    Review of Systems   {ROS COMP (Optional):432891}      Objective    /66 (BP Location: Left arm, Patient Position: Sitting, Cuff Size: Adult Regular)   Pulse 86   Temp 97.6  F (36.4  C) (Temporal)   Resp 18   Wt 60.3 kg (133 lb)   LMP 08/02/2022   SpO2 98%   BMI 21.73 kg/m    Body mass index is 21.73 kg/m .  Physical Exam   {Exam List (Optional):170829}    {Diagnostic Test Results (Optional):729619}    {AMBULATORY ATTESTATION (Optional):164321}            .  ..

## 2022-08-18 ENCOUNTER — PATIENT OUTREACH (OUTPATIENT)
Dept: FAMILY MEDICINE | Facility: CLINIC | Age: 32
End: 2022-08-18

## 2022-08-18 DIAGNOSIS — R87.611 PAP SMEAR OF CERVIX WITH ASCUS, CANNOT EXCLUDE HGSIL: ICD-10-CM

## 2022-08-18 NOTE — TELEPHONE ENCOUNTER
"8/3/22 LEEP @ 12 o'clock - \"Moderate and severe dysplastic change (PAPO-2-3) with extension into underlying endocervical glands, no evidence of invasive malignancy, resection margins free of dysplastic change\"   "

## 2022-09-12 ENCOUNTER — OFFICE VISIT (OUTPATIENT)
Dept: FAMILY MEDICINE | Facility: CLINIC | Age: 32
End: 2022-09-12
Payer: COMMERCIAL

## 2022-09-12 VITALS
WEIGHT: 138 LBS | OXYGEN SATURATION: 99 % | TEMPERATURE: 97.8 F | SYSTOLIC BLOOD PRESSURE: 116 MMHG | DIASTOLIC BLOOD PRESSURE: 66 MMHG | BODY MASS INDEX: 22.55 KG/M2 | HEART RATE: 94 BPM | RESPIRATION RATE: 16 BRPM

## 2022-09-12 DIAGNOSIS — N87.9 CERVICAL DYSPLASIA: ICD-10-CM

## 2022-09-12 DIAGNOSIS — N92.0 MENORRHAGIA WITH REGULAR CYCLE: Primary | ICD-10-CM

## 2022-09-12 DIAGNOSIS — R10.2 PELVIC PAIN IN FEMALE: ICD-10-CM

## 2022-09-12 LAB
ERYTHROCYTE [DISTWIDTH] IN BLOOD BY AUTOMATED COUNT: 11.7 % (ref 10–15)
HCT VFR BLD AUTO: 38.8 % (ref 35–47)
HGB BLD-MCNC: 13.4 G/DL (ref 11.7–15.7)
MCH RBC QN AUTO: 30.9 PG (ref 26.5–33)
MCHC RBC AUTO-ENTMCNC: 34.5 G/DL (ref 31.5–36.5)
MCV RBC AUTO: 90 FL (ref 78–100)
PLATELET # BLD AUTO: 168 10E3/UL (ref 150–450)
RBC # BLD AUTO: 4.33 10E6/UL (ref 3.8–5.2)
TSH SERPL DL<=0.005 MIU/L-ACNC: 1.92 MU/L (ref 0.4–4)
WBC # BLD AUTO: 6.7 10E3/UL (ref 4–11)

## 2022-09-12 PROCEDURE — 99214 OFFICE O/P EST MOD 30 MIN: CPT | Performed by: OBSTETRICS & GYNECOLOGY

## 2022-09-12 PROCEDURE — 84443 ASSAY THYROID STIM HORMONE: CPT | Performed by: OBSTETRICS & GYNECOLOGY

## 2022-09-12 PROCEDURE — 36415 COLL VENOUS BLD VENIPUNCTURE: CPT | Performed by: OBSTETRICS & GYNECOLOGY

## 2022-09-12 PROCEDURE — 85027 COMPLETE CBC AUTOMATED: CPT | Performed by: OBSTETRICS & GYNECOLOGY

## 2022-09-12 ASSESSMENT — PAIN SCALES - GENERAL: PAINLEVEL: NO PAIN (0)

## 2022-09-12 NOTE — PROGRESS NOTES
ASSESSMENT/PLAN:                                                    Mayito Hillquested this consult due to concerns regarding   menorrhagia    My assessment and plan are as follows:       1.  32-year-old female  3 para 3 with menorrhagia and severe dysmenorrhea.  She has a history of cervical dysplasia and had a recent LEEP surgery.  She is requesting hysterectomy for definitive management.  She might be a candidate for an ablation but this would not take care of the severe dysmenorrhea so I think that hysterectomy makes the most sense.  I will order an ultrasound to evaluate the uterine architecture and also check a CBC and TSH at this time.    2.  She has no significant urinary stress incontinence at this time.         3.  I will plan to call her with results of the ultrasound and lab work and then make further recommendations at that time.                                                           4. She signed a sterilization consent today.            Thank you for this consultation.    Copy to  Mayito Hill MD          SUBJECTIVE:                                                      Referral from Mayito Hill       Reason for consultation:   Menorrhagia and severe dysmenorrhea and history of recent cervical dysplasia/LEEP surgery    History:  Destiny  Is a 32 year old female  3 para 3003(  3 SVDs )   who presents today because of menorrhagia and severe dysmenorrhea.  She also had a recent LEEP surgery for PAPO-2/3.  Margins were negative.    She has failed conservative management for menorrhagia and dysmenorrhea.  She has tried several different birth control pills.  She declines an IUD.  She does not like the idea of something implanted inside of her.                 Patient Active Problem List   Diagnosis     ASC-H Pap smear of cervix ( ASCUS, cannot exclude HGSIL)     Cervical high risk HPV # 16 (human papillomavirus) test positive     History of cervical dysplasia      Dysplasia of cervix     Past Surgical History:   Procedure Laterality Date     NO HISTORY OF SURGERY         Social History     Tobacco Use     Smoking status: Current Every Day Smoker     Packs/day: 0.50     Start date: 4/19/2014     Smokeless tobacco: Never Used   Substance Use Topics     Alcohol use: Yes     Alcohol/week: 5.0 standard drinks     Types: 5 Standard drinks or equivalent per week     Comment: 5 per week     Family History   Problem Relation Age of Onset     Hypertension Father      Breast Cancer Maternal Grandmother      Obesity Maternal Grandfather      Diabetes Paternal Grandmother      Obesity Paternal Grandmother      Cancer Paternal Grandfather      Lung Cancer Paternal Grandfather      Bladder Cancer Paternal Grandfather          Current Outpatient Medications   Medication Sig Dispense Refill     diazepam (VALIUM) 5 MG tablet Take 1 tablet 1/2 hour prior to procedure, may repeat x 1 dose if needed (Patient not taking: No sig reported) 2 tablet 0       ROS:  A 12 point systems review is negative except for what is listed above in the Subjective history.        OBJECTIVE:                                                    Vital signs: Blood pressure 116/66, pulse 94, temperature 97.8  F (36.6  C), temperature source Temporal, resp. rate 16, weight 62.6 kg (138 lb), last menstrual period 09/02/2022, SpO2 99 %, not currently breastfeeding.        HEENT is mauro mouth not examined due to wearing mask l.      Neck is supple, mobile, no adenoapthy or masses palpable. Normal range of motion noted.     Chest is clear to auscultation. No wheezes, rales or rhonchi heard.  cardiac exam is normal with s1, s2, no murmurs or adventitious sounds.Normal rate and rhythm is heard.     Abdomen is soft,  nondistended, No masses felt.No HSM. No guarding or rigidity or rebound noted. Palpation reveals  no    tenderness   Normal bowel sounds heard.     Pelvic exam:My nurse Arturo  was present to chaperone the  exam.    The external genitalia appeared normal.      The vaginal vault was without bleeding  or   discharge or odor.      The cervix was smooth and shiny and normal in appearance.          No vaginal support defects were noted,      Bimanual exam revealed a 6-7 week  sized uterus. It does  descend  Somewhat  well in the vaginal vault.      No adnexal masses were felt.      There was no  cervical motion tenderness.      Exam was NOT   limited by the patient's body habitus.        -------------------------------------------------    A total of 30 minutes were spent in today's visit including the time spent with  the patient in addition to the time spent just prior to the visit reviewing the chart  and then charting afterwards on this patient today.        Thank you for this consultation.    Copy to  Mayito Hill MD  [facility]

## 2022-09-19 ENCOUNTER — HOSPITAL ENCOUNTER (OUTPATIENT)
Dept: ULTRASOUND IMAGING | Facility: CLINIC | Age: 32
Discharge: HOME | End: 2022-09-19
Attending: OBSTETRICS & GYNECOLOGY | Admitting: OBSTETRICS & GYNECOLOGY
Payer: COMMERCIAL

## 2022-09-19 DIAGNOSIS — N92.0 MENORRHAGIA WITH REGULAR CYCLE: ICD-10-CM

## 2022-09-19 PROCEDURE — 76856 US EXAM PELVIC COMPLETE: CPT

## 2022-09-29 ENCOUNTER — VIRTUAL VISIT (OUTPATIENT)
Dept: FAMILY MEDICINE | Facility: CLINIC | Age: 32
End: 2022-09-29
Payer: COMMERCIAL

## 2022-09-29 DIAGNOSIS — N92.0 MENORRHAGIA WITH REGULAR CYCLE: Primary | ICD-10-CM

## 2022-09-29 PROCEDURE — 99213 OFFICE O/P EST LOW 20 MIN: CPT | Mod: 95 | Performed by: OBSTETRICS & GYNECOLOGY

## 2022-09-29 NOTE — PROGRESS NOTES
[phone]     Subjective: Destiny requested a phone consultation today because of concerns regarding follow-up of menorrhagia-discussion of results.     She had an ultrasound on  which showed:      FINDINGS:     UTERUS: 9.8 x 6.0 x 3.9 cm. Normal in size and position with no masses.     ENDOMETRIUM: 11-12 mm. Normal smooth endometrium.     RIGHT OVARY: 3.4 x 1.6 x 1.6 cm. Normal.      LEFT OVARY: 2.9 x 1.9 x 1.7 cm. Normal.     No significant free fluid.                                                                      IMPRESSION:  1.  Negative pelvic ultrasound.    The past medical history and medications and allergies have been reviewed today by me.  .  Past Medical History:   Diagnosis Date     Adjustment reaction with anxiety and depression 2011    Formatting of this note might be different from the original. postpartum     ASC-H Pap smear of cervix ( ASCUS, cannot exclude HGSIL) 2017     Cervical high risk HPV (human papillomavirus) test positive 2018: See problem list.      H/O colposcopy with cervical biopsy 2017    PAPO 2     Allergies   Allergen Reactions     No Known Drug Allergies      Current Outpatient Medications   Medication Sig Dispense Refill     diazepam (VALIUM) 5 MG tablet Take 1 tablet 1/2 hour prior to procedure, may repeat x 1 dose if needed (Patient not taking: No sig reported) 2 tablet 0       Assessment/Plan:menorrhagia  32-year-old female  3 para 3 who had a recent LEEP on August 3 for PAPO-2/3 with positive margins.  Also she has significant menorrhagia and severe dysmenorrhea.  She is requesting hysterectomy.  I do not believe conservative measures will work because she has positive margins on her LEEP and therefore she does need hysterectomy.  We will plan to do this in late October or early November depending upon what is available for operative time.  I will ask Dr. Edwards to assist me.  I have discussed that we will attempt  laparoscopic approach with bilateral salpingectomy and I would advise her to take 6 weeks off of work.  She did decline an IUD and has previously tried 2 different types of birth control pills for the menorrhagia and dysmenorrhea.  I will plan to see her in clinic in late October for preop exam and consent signing.      Phone call duration was   8  minutes.  Additional 12minutes spent in chart review today as well as charting.  This was all done today.    RACHELLE Alegria MD

## 2022-10-03 ENCOUNTER — TELEPHONE (OUTPATIENT)
Dept: FAMILY MEDICINE | Facility: CLINIC | Age: 32
End: 2022-10-03

## 2022-10-03 NOTE — TELEPHONE ENCOUNTER
Reason for Call:  Asking to change Hysterectomy date if ok with your schedule.    Detailed comments:  Can you switch this Hysterectomy surgery date  from Oct. 31st to Nov. 7th?  The 7th works better for patient she will have someone there to help her    Phone Number Patient can be reached at:   Cell # [phone]    Best Time: Anytime    Can we leave a detailed message on this number?   YES    Call taken on 10/3/2022 at 10:42 AM by Mona CULVER     [facility]

## 2022-10-05 NOTE — TELEPHONE ENCOUNTER
Forms/Letter Request    Type of form/letter: FMLA - Unknown    Have you been seen for this request: Yes     Do we have the form/letter: Yes:     When is form/letter needed by: 10/19/2022    How would you like the form/letter returned:     Patient Notified form requests are processed in 3-5 business days:Yes    Could we send this information to you in [employer] or would you prefer to receive a phone call?:   Patient would prefer a phone call   Okay to leave a detailed message?: Yes at [phone]

## 2022-10-09 ENCOUNTER — HEALTH MAINTENANCE LETTER (OUTPATIENT)
Age: 32
End: 2022-10-09

## 2022-10-10 ENCOUNTER — TELEPHONE (OUTPATIENT)
Dept: FAMILY MEDICINE | Facility: CLINIC | Age: 32
End: 2022-10-10

## 2022-10-10 NOTE — TELEPHONE ENCOUNTER
Patient calling wondering about her upcoming hysterectomy. She is wondering what time she needs to arrive and any information regarding the procedure. RN was unsure if this is something that comes from the provider closer to surgery date or if the OR/preop team does this.     Gwen Daniels, JASMYNN, RN

## 2022-10-27 ENCOUNTER — OFFICE VISIT (OUTPATIENT)
Dept: FAMILY MEDICINE | Facility: CLINIC | Age: 32
End: 2022-10-27
Payer: COMMERCIAL

## 2022-10-27 VITALS
SYSTOLIC BLOOD PRESSURE: 116 MMHG | DIASTOLIC BLOOD PRESSURE: 71 MMHG | HEART RATE: 77 BPM | RESPIRATION RATE: 15 BRPM | WEIGHT: 141.2 LBS | BODY MASS INDEX: 23.07 KG/M2 | OXYGEN SATURATION: 98 %

## 2022-10-27 DIAGNOSIS — N92.1 MENORRHAGIA WITH IRREGULAR CYCLE: ICD-10-CM

## 2022-10-27 DIAGNOSIS — N87.9 CERVICAL DYSPLASIA: ICD-10-CM

## 2022-10-27 DIAGNOSIS — Z01.818 PREOP GENERAL PHYSICAL EXAM: Primary | ICD-10-CM

## 2022-10-27 LAB
ABO/RH(D): NORMAL
ANTIBODY SCREEN: NEGATIVE
SPECIMEN EXPIRATION DATE: NORMAL

## 2022-10-27 PROCEDURE — 99214 OFFICE O/P EST MOD 30 MIN: CPT | Performed by: OBSTETRICS & GYNECOLOGY

## 2022-10-27 NOTE — PROGRESS NOTES
Preop Questions 10/27/2022   1. Have you ever had a heart attack or stroke? No   2. Have you ever had surgery on your heart or blood vessels, such as a stent placement, a coronary artery bypass, or surgery on an artery in your head, neck, heart, or legs? No   3. Do you have chest pain with activity? No   4. Do you have a history of  heart failure? No   5. Do you currently have a cold, bronchitis or symptoms of other infection? No   6. Do you have a cough, shortness of breath, or wheezing? No   7. Do you or anyone in your family have previous history of blood clots? UNKNOWN -     8. Do you or does anyone in your family have a serious bleeding problem such as prolonged bleeding following surgeries or cuts? UNKNOWN -     9. Have you ever had problems with anemia or been told to take iron pills? YES -     10. Have you had any abnormal blood loss such as black, tarry or bloody stools, or abnormal vaginal bleeding? No   11. Have you ever had a blood transfusion? No   12. Are you willing to have a blood transfusion if it is medically needed before, during, or after your surgery? Yes   13. Have you or any of your relatives ever had problems with anesthesia? UNKNOWN -     14. Do you have sleep apnea, excessive snoring or daytime drowsiness? No   15. Do you have any artifical heart valves or other implanted medical devices like a pacemaker, defibrillator, or continuous glucose monitor? No   16. Do you have artificial joints? No   17. Are you allergic to latex? No   18. Is there any chance that you may be pregnant? No     Preoperative History and Physical    Chief complaint/indicated for surgery/planned surgery:    Destiny is planning to undergo  Total laparoscopic hysterectomy  And  laparoscopic bilateral salpingectomy   by Dr Alegria.    Past Medical History:   Diagnosis Date     Adjustment reaction with anxiety and depression 08/11/2011    Formatting of this note might be different from the original. postpartum      ASC-H Pap smear of cervix ( ASCUS, cannot exclude HGSIL) 05/01/2017     Cervical high risk HPV (human papillomavirus) test positive 05/11/2018 05/11/18: See problem list.      H/O colposcopy with cervical biopsy 05/19/2017    PAPO 2     Current Outpatient Medications   Medication Sig Dispense Refill     diazepam (VALIUM) 5 MG tablet Take 1 tablet 1/2 hour prior to procedure, may repeat x 1 dose if needed (Patient not taking: Reported on 8/3/2022) 2 tablet 0       There has been no recent use of OTC or herbal medications.   The patient denies any recent ASA or NSAID use.   The patient denies any recent steroid use within the last 6 months.   The patient denies any alcohol or drug use.     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 Not Asked      Service Yes     Comment: [employer] - discharged April 2008     Blood Transfusions No     Caffeine Concern Yes     Comment: Advised not more than 200 mg/day      Occupational Exposure Yes     Comment: [employer] No     Sleep Concern No     Stress Concern No     Weight Concern No     Special Diet No     Back Care Yes     Comment: Work-related back sprain/strain lower back     Exercise No     Comment: Advised walking 30 minutes/day     Bike Helmet Not Asked     Seat Belt Yes     Self-Exams Not Asked   Social History Narrative    Lives in [place] with , Jared and three daughters.      Social Determinants of Health     Financial Resource Strain: Not on file   Food Insecurity: Not on file   Transportation Needs: Not on file   Physical Activity: Not on file   Stress: Not on file   Social Connections: Not on file   Intimate Partner Violence: Not on file   Housing Stability: Not on file     Family History   Problem Relation Age of Onset     Hypertension Father      Breast Cancer Maternal Grandmother      Obesity Maternal Grandfather      Diabetes Paternal Grandmother      Obesity Paternal Grandmother      Cancer Paternal Grandfather      Lung Cancer Paternal Grandfather      Bladder Cancer Paternal Grandfather        There is no family history of anesthesia reactions or malignant hyperthermia or psevdocholinestergse problem or porphyria.    Review Of Systems  Skin: negative  Eyes: negative  Ears/Nose/Throat: negative  Respiratory: No shortness of breath, dyspnea on exertion, cough, or hemoptysis  Cardiovascular: negative  Gastrointestinal: negative  Genitourinary: negative  Musculoskeletal: negative  Neurologic: negative  Psychiatric: negative  Hematologic/Lymphatic/Immunologic: negative  Endocrine: negative    Physical Exam:/71   Pulse 77   Resp 15   Wt 64 kg (141 lb 3.2 oz)   LMP 09/02/2022   SpO2 98%   BMI 23.07 kg/m      HEENT:    Sclerae and conjunctiva are normal.   Ear canals and TMs look normal.  Nasal mucosa is pink  - no polyps or masses seen.  Throat is unremarkable . Mucous membranes are moist.     Neck is supple, mobile, no  adenopathy or masses palpable. The thyroid feels normal.   Normal range of motion noted.    Chest is clear to auscultation.  No wheezes, rales or rhonchi heard.  Cardiac exam is normal with s1, s2, no murmurs or adventitious sounds.Normal rate and rhythm is heard.     Abdomen is soft,  nondistended, No masses felt.No HSM. No guarding or rigidity or rebound   noted. Palpation reveals  no    tenderness   Normal bowel sounds heard.     Extremities are pink and there is no cyanosis and there is no edema. Pulses are physiologic.    The neurologic exam is grossly intact.Motor and sensory exams are grossly normal. Cranial nerves 2-12 are intact. Cerebellar exam is normal.             Other:  Labs: Recent CBC was normal.  She will have a creatinine and type and screen done tomorrow along with a COVID test.    Assessment/Impression:  1.  32-year-old female with cervical dysplasia and menorrhagia and dysmenorrhea.  Planning to have hysterectomy.    2.Preoperative  Examination: The patient is at LOW   risk for general anesthesia for the proposed surgery.          Recommendations:  Approval given for general anesthesia.    Medications are to be stopped before surgery.   Discontinue ASA and NSAIDS 5 days prior to surgery to reduce bleeding risk.        PREOP GYN CONSULT FOR HYSTERECTOMY    PATIENT PROFILE/INDICATION FOR SURGERY:   32-year-old female  3 para 3 with cervical dysplasia diagnosed by LEEP surgery and the margins were positive.  Also she has menorrhagia and severe dysmenorrhea.  She has tried 2 different oral contraceptives and this did not help.  She declined an IUD.    ASSESSMENT: Cervical dysplasia, menorrhagia and severe dysmenorrhea.    PLAN:   HYSTERECTOMY-SEE BELOW-    REFERRING PHYSICIAN:  Liz  AGE:  32 year old  HEIGHT:  Data Unavailable  WEIGHT:  141 lbs 3.2 oz  ALLERGIES:     Allergies   Allergen Reactions     No Known Drug Allergies        ULTRASOUND FINDINGS:       FINDINGS:     UTERUS: 9.8 x  6.0 x 3.9 cm. Normal in size and position with no masses.     ENDOMETRIUM: 11-12 mm. Normal smooth endometrium.     RIGHT OVARY: 3.4 x 1.6 x 1.6 cm. Normal.      LEFT OVARY: 2.9 x 1.9 x 1.7 cm. Normal.     No significant free fluid.                                                                      IMPRESSION:  1.  Negative pelvic ultrasound.    Past Medical History:   Diagnosis Date     Adjustment reaction with anxiety and depression 08/11/2011    Formatting of this note might be different from the original. postpartum     ASC-H Pap smear of cervix ( ASCUS, cannot exclude HGSIL) 05/01/2017     Cervical high risk HPV (human papillomavirus) test positive 05/11/2018 05/11/18: See problem list.      H/O colposcopy with cervical biopsy 05/19/2017    PAPO 2     PLANNED SURGERY:  Total laparoscopic hysterectomy and  laparoscopic bilateral salpingectomy      PLAN FOR INCISION:  Probable pfannenstiel incision, if incision is necessary. Final determination will be made at the time of surgery.      I did inform the patient that the final decision about incision will be based on my examination under anesthesia.  I will use clinical judgment regarding how much surgical exposure is necessary and then determine if a midline or a transverse incision is best.    PLAN REGARDING HER CERVIX:  Remove if possible    The patient did read over an information sheet regarding whether to remove the cervix with the uterus or keep it intact.  I explained that there are some controversies surrounding whether the cervix is involved in sexual orgasm.  Therefore, some women choose to keep it.  Others feel that keeping the cervix contributes to the pelvic support of the vaginal apex and cuff.  However, she understands that if the cervix is kept, a Pap should be done at regular intervals to rule out and LEEP surgery pathology showed positive margins.  Also she has.  Keeping the cervix is not generally advised for women with previously-abnormal  Paps.  Also, retaining the cervix might lead to minor spotting at the time of menses because there is uterine tissue in the cervix.  If she decides to have the cervix removed at a future date, it will be more challenging surgery because the bladder may adhere to the back side of it, requiring surgical expertise at another institution to perform a trachelectomy.  She was advised to read more about this on line.  I explained that sometimes it is quite difficult to remove the cervix especially if there has been previous surgery such as a  section. If we stop short of removing the cervix it would be due to a safety concern such as excessive bleeding.     PLAN REGARDING OVARIES:   Keep intact if possible.  She knows that if we see something very suspicious for an abnormality on the ovary that we might remove 1 or both but it is extremely unlikely since the ultrasound was reassuring.    We had a discussion about whether to keep the ovaries or remove them with surgery.  She knows that by keeping the ovaries, there is a 5% chance of needing another surgery in the future to remove one or both if a cyst or cancer develops.  If the ovaries are taken, then menopause ensues.  We discussed the symptoms and risks of this, including risks of taking hormone replacement.    Bladder symptoms:   There are no   symptoms of stress urinary incontinence.      PLAN REGARDING FALLOPIAN TUBES:           We discussed that recent evidence has shown that removal of the fallopian tubes reduces the risk of ovarian cancer.  Therefore, I usually advise removing the tubes if they are accessible  but only if the procedure does not involve excessive risk due to adhesions or some other anatomical consideration.       RISKS DISCUSSION:         She was given several  detailed pamphlets  describing the surgery and the more common risks involved.  We discussed the risks, which include but are not limited to bleeding, infection, possible injury to  the organs, including the bowel, bladder, ureters and other internal organs, possible need for cystoscopy, possible vesicovaginal fistula or rectovaginal fistula formation, possible nerve paresthesias due to retractor placement, possible changes in sexual function and changes in urination and anesthesia risks.   After answering her questions, I handed her a consent form and allowed her to review it thoroughly.  She was offered a second opinion and declined.   She understands that if a bladder injury is discovered it will be addressed but she may need to go home with a Sanchez catheter in her bladder for one or more weeks to allow the bladder or other tissues to heal.  There is the option of simply starting with an open surgery which might lower her risk of bladder injury but she does want a laparoscopy even if it means slightly higher risk of bladder injury.The risk of bladder injury with open surgery is generally 1-2% and the risk with laparoscopy can be anywhere from 2-5%. We usually plan this surgery for a day when our urologist is present but there are infrequent occasions when the urologist has changed plans and is not available that day. I will likely perform a cystoscopy to look into the bladder after the surgery to inspect the ureter openings for urine flow and also to make sure the bladder has no lacerations or sutures present. Sometimes that is difficult to tell even with cystoscopy.She signed the form witnessed by my nurse.  I signed it as well.     HOME SUPPORT:  She understands that she will need some help around the home for the first several weeks after surgery.    EXPECTATIONS:  I told her that while most surgeries go smoothly, a perfectly-smooth course should not be assumed.  She should expect some form of inconvenience along the way.  It might be a minor wound infection or a slow return of bowel function or perhaps a more serious concern, such as pneumonia or blood clot.  At any rate, I and my  partners will follow her through these unanticipated obstacles.  She knows to expect 3 or 4 days in the hospital if open laparotomy is done and 6 weeks at home recuperating and 3-4 months until full recovery occurs. If laparoscopy is successful she may be discharged to home as same-day surgery. She will need to limit lifting to 20 pounds for 6-12 weeks after surgery depending upon what is done.  I advised her to limit visitors on the first hospital day to allow her pain medications to work more effectively.  She knows to be n.p.o. 8 hours before the surgery.  She will have preoperative laboratory work.  If it is abnormal, I may choose to postpone her surgery.        After discussing these issues today and on previous occasions, I feel that she has a very-thorough understanding of the indications for surgery and the possible risks associated with it.             RACHELLE Alegria MD

## 2022-10-27 NOTE — PATIENT INSTRUCTIONS
Preparing for Your Surgery  Getting started  A nurse will call you to review your health history and instructions. They will give you an arrival time based on your scheduled surgery time. Please be ready to share:    Your doctor's clinic name and phone number    Your medical, surgical and anesthesia history    A list of allergies and sensitivities    A list of medicines, including herbal treatments and over-the-counter drugs    Whether the patient has a legal guardian (ask how to send us the papers in advance)  Please tell us if you're pregnant--or if there's any chance you might be pregnant. Some surgeries may injure a fetus (unborn baby), so they require a pregnancy test. Surgeries that are safe for a fetus don't always need a test, and you can choose whether to have one.   If you have a child who's having surgery, please ask for a copy of Preparing for Your Child's Surgery.    Preparing for surgery    Within 10 to 30 days of surgery: Have a pre-op exam (sometimes called an H&P, or History and Physical). This can be done at a clinic or pre-operative center.  ? If you're having a , you may not need this exam. Talk to your care team.    At your pre-op exam, talk to your care team about all medicines you take. If you need to stop any medicines before surgery, ask when to start taking them again.  ? We do this for your safety. Many medicines can make you bleed too much during surgery. Some change how well surgery (anesthesia) drugs work.    Call your insurance company to let them know you're having surgery. (If you don't have insurance, call [phone].)    Call your clinic if there's any change in your health. This includes signs of a cold or flu (sore throat, runny nose, cough, rash, fever). It also includes a scrape or scratch near the surgery site.    If you have questions on the day of surgery, call your hospital or surgery center.  COVID testing  You may need to be tested for COVID-19 before having  surgery. If so, we will give you instructions (or click here).  Eating and drinking guidelines  For your safety: Unless your surgeon tells you otherwise, follow the guidelines below.    Eat and drink as usual until 8 hours before surgery. After that, no food or milk.    Drink clear liquids until 2 hours before surgery. These are liquids you can see through, like water, Gatorade and Propel Water. You may also have black coffee and tea (no cream or milk).    Nothing by mouth within 2 hours of surgery. This includes gum, candy and breath mints.    If you drink alcohol: Stop drinking it the night before surgery.    If your care team tells you to take medicine on the morning of surgery, it's okay to take it with a sip of water.  Preventing infection    Shower or bathe the night before and morning of your surgery. Follow the instructions your clinic gave you. (If no instructions, use regular soap.)    Don't shave or clip hair near your surgery site. We'll remove the hair if needed.    Don't smoke or vape the morning of surgery. You may chew nicotine gum up to 2 hours before surgery. A nicotine patch is okay.  ? Note: Some surgeries require you to completely quit smoking and nicotine. Check with your surgeon.    Your care team will make every effort to keep you safe from infection. We will:  ? Clean our hands often with soap and water (or an alcohol-based hand rub).  ? Clean the skin at your surgery site with a special soap that kills germs.  ? Give you a special gown to keep you warm. (Cold raises the risk of infection.)  ? Wear special hair covers, masks, gowns and gloves during surgery.  ? Give antibiotic medicine, if prescribed. Not all surgeries need antibiotics.  What to bring on the day of surgery    Photo ID and insurance card    Copy of your health care directive, if you have one    Glasses and hearing aides (bring cases)  ? You can't wear contacts during surgery    Inhaler and eye drops, if you use them (tell us  about these when you arrive)    CPAP machine or breathing device, if you use them    A few personal items, if spending the night    If you have . . .  ? A pacemaker, ICD (cardiac defibrillator) or other implant: Bring the ID card.  ? An implanted stimulator: Bring the remote control.  ? A legal guardian: Bring a copy of the certified (court-stamped) guardianship papers.  Please remove any jewelry, including body piercings. Leave jewelry and other valuables at home.  If you're going home the day of surgery    You must have a responsible adult drive you home. They should stay with you overnight as well.    If you don't have someone to stay with you, and you aren't safe to go home alone, we may keep you overnight. Insurance often won't pay for this.  After surgery  If it's hard to control your pain or you need more pain medicine, please call your surgeon's office.  Questions?   If you have any questions for your care team, list them here: _________________________________________________________________________________________________________________________________________________________________________ ____________________________________ ____________________________________ ____________________________________  For informational purposes only. Not to replace the advice of your health care provider. Copyright   2003, 2019 [facility]. All rights reserved. Clinically reviewed by Chiqui Ingram MD. [employer] [id number] - REV 07/22.

## 2022-10-27 NOTE — H&P (VIEW-ONLY)
Preop Questions 10/27/2022   1. Have you ever had a heart attack or stroke? No   2. Have you ever had surgery on your heart or blood vessels, such as a stent placement, a coronary artery bypass, or surgery on an artery in your head, neck, heart, or legs? No   3. Do you have chest pain with activity? No   4. Do you have a history of  heart failure? No   5. Do you currently have a cold, bronchitis or symptoms of other infection? No   6. Do you have a cough, shortness of breath, or wheezing? No   7. Do you or anyone in your family have previous history of blood clots? UNKNOWN -     8. Do you or does anyone in your family have a serious bleeding problem such as prolonged bleeding following surgeries or cuts? UNKNOWN -     9. Have you ever had problems with anemia or been told to take iron pills? YES -     10. Have you had any abnormal blood loss such as black, tarry or bloody stools, or abnormal vaginal bleeding? No   11. Have you ever had a blood transfusion? No   12. Are you willing to have a blood transfusion if it is medically needed before, during, or after your surgery? Yes   13. Have you or any of your relatives ever had problems with anesthesia? UNKNOWN -     14. Do you have sleep apnea, excessive snoring or daytime drowsiness? No   15. Do you have any artifical heart valves or other implanted medical devices like a pacemaker, defibrillator, or continuous glucose monitor? No   16. Do you have artificial joints? No   17. Are you allergic to latex? No   18. Is there any chance that you may be pregnant? No     Preoperative History and Physical    Chief complaint/indicated for surgery/planned surgery:    Destiny is planning to undergo  Total laparoscopic hysterectomy  And  laparoscopic bilateral salpingectomy   by Dr Alegria.    Past Medical History:   Diagnosis Date     Adjustment reaction with anxiety and depression 08/11/2011    Formatting of this note might be different from the original. postpartum      ASC-H Pap smear of cervix ( ASCUS, cannot exclude HGSIL) 05/01/2017     Cervical high risk HPV (human papillomavirus) test positive 05/11/2018 05/11/18: See problem list.      H/O colposcopy with cervical biopsy 05/19/2017    PAPO 2     Current Outpatient Medications   Medication Sig Dispense Refill     diazepam (VALIUM) 5 MG tablet Take 1 tablet 1/2 hour prior to procedure, may repeat x 1 dose if needed (Patient not taking: Reported on 8/3/2022) 2 tablet 0       There has been no recent use of OTC or herbal medications.   The patient denies any recent ASA or NSAID use.   The patient denies any recent steroid use within the last 6 months.   The patient denies any alcohol or drug use.     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 Not Asked      Service Yes     Comment: [employer] - discharged April 2008     Blood Transfusions No     Caffeine Concern Yes     Comment: Advised not more than 200 mg/day      Occupational Exposure Yes     Comment: [employer] Hazards No     Sleep Concern No     Stress Concern No     Weight Concern No     Special Diet No     Back Care Yes     Comment: Work-related back sprain/strain lower back     Exercise No     Comment: Advised walking 30 minutes/day     Bike Helmet Not Asked     Seat Belt Yes     Self-Exams Not Asked   Social History Narrative    Lives in [place] with , Jared and three daughters.      Social Determinants of Health     Financial Resource Strain: Not on file   Food Insecurity: Not on file   Transportation Needs: Not on file   Physical Activity: Not on file   Stress: Not on file   Social Connections: Not on file   Intimate Partner Violence: Not on file   Housing Stability: Not on file     Family History   Problem Relation Age of Onset     Hypertension Father      Breast Cancer Maternal Grandmother      Obesity Maternal Grandfather      Diabetes Paternal Grandmother      Obesity Paternal Grandmother      Cancer Paternal Grandfather      Lung Cancer Paternal Grandfather      Bladder Cancer Paternal Grandfather        There is no family history of anesthesia reactions or malignant hyperthermia or psevdocholinestergse problem or porphyria.    Review Of Systems  Skin: negative  Eyes: negative  Ears/Nose/Throat: negative  Respiratory: No shortness of breath, dyspnea on exertion, cough, or hemoptysis  Cardiovascular: negative  Gastrointestinal: negative  Genitourinary: negative  Musculoskeletal: negative  Neurologic: negative  Psychiatric: negative  Hematologic/Lymphatic/Immunologic: negative  Endocrine: negative    Physical Exam:/71   Pulse 77   Resp 15   Wt 64 kg (141 lb 3.2 oz)   LMP 09/02/2022   SpO2 98%   BMI 23.07 kg/m      HEENT:    Sclerae and conjunctiva are normal.   Ear canals and TMs look normal.  Nasal mucosa is pink  - no polyps or masses seen.  Throat is unremarkable . Mucous membranes are moist.     Neck is supple, mobile, no  adenopathy or masses palpable. The thyroid feels normal.   Normal range of motion noted.    Chest is clear to auscultation.  No wheezes, rales or rhonchi heard.  Cardiac exam is normal with s1, s2, no murmurs or adventitious sounds.Normal rate and rhythm is heard.     Abdomen is soft,  nondistended, No masses felt.No HSM. No guarding or rigidity or rebound   noted. Palpation reveals  no    tenderness   Normal bowel sounds heard.     Extremities are pink and there is no cyanosis and there is no edema. Pulses are physiologic.    The neurologic exam is grossly intact.Motor and sensory exams are grossly normal. Cranial nerves 2-12 are intact. Cerebellar exam is normal.             Other:  Labs: Recent CBC was normal.  She will have a creatinine and type and screen done tomorrow along with a COVID test.    Assessment/Impression:  1.  32-year-old female with cervical dysplasia and menorrhagia and dysmenorrhea.  Planning to have hysterectomy.    2.Preoperative  Examination: The patient is at LOW   risk for general anesthesia for the proposed surgery.          Recommendations:  Approval given for general anesthesia.    Medications are to be stopped before surgery.   Discontinue ASA and NSAIDS 5 days prior to surgery to reduce bleeding risk.        PREOP GYN CONSULT FOR HYSTERECTOMY    PATIENT PROFILE/INDICATION FOR SURGERY:   32-year-old female  3 para 3 with cervical dysplasia diagnosed by LEEP surgery and the margins were positive.  Also she has menorrhagia and severe dysmenorrhea.  She has tried 2 different oral contraceptives and this did not help.  She declined an IUD.    ASSESSMENT: Cervical dysplasia, menorrhagia and severe dysmenorrhea.    PLAN:   HYSTERECTOMY-SEE BELOW-    REFERRING PHYSICIAN:  Liz  AGE:  32 year old  HEIGHT:  Data Unavailable  WEIGHT:  141 lbs 3.2 oz  ALLERGIES:     Allergies   Allergen Reactions     No Known Drug Allergies        ULTRASOUND FINDINGS:       FINDINGS:     UTERUS: 9.8 x  6.0 x 3.9 cm. Normal in size and position with no masses.     ENDOMETRIUM: 11-12 mm. Normal smooth endometrium.     RIGHT OVARY: 3.4 x 1.6 x 1.6 cm. Normal.      LEFT OVARY: 2.9 x 1.9 x 1.7 cm. Normal.     No significant free fluid.                                                                      IMPRESSION:  1.  Negative pelvic ultrasound.    Past Medical History:   Diagnosis Date     Adjustment reaction with anxiety and depression 08/11/2011    Formatting of this note might be different from the original. postpartum     ASC-H Pap smear of cervix ( ASCUS, cannot exclude HGSIL) 05/01/2017     Cervical high risk HPV (human papillomavirus) test positive 05/11/2018 05/11/18: See problem list.      H/O colposcopy with cervical biopsy 05/19/2017    PAPO 2     PLANNED SURGERY:  Total laparoscopic hysterectomy and  laparoscopic bilateral salpingectomy      PLAN FOR INCISION:  Probable pfannenstiel incision, if incision is necessary. Final determination will be made at the time of surgery.      I did inform the patient that the final decision about incision will be based on my examination under anesthesia.  I will use clinical judgment regarding how much surgical exposure is necessary and then determine if a midline or a transverse incision is best.    PLAN REGARDING HER CERVIX:  Remove if possible    The patient did read over an information sheet regarding whether to remove the cervix with the uterus or keep it intact.  I explained that there are some controversies surrounding whether the cervix is involved in sexual orgasm.  Therefore, some women choose to keep it.  Others feel that keeping the cervix contributes to the pelvic support of the vaginal apex and cuff.  However, she understands that if the cervix is kept, a Pap should be done at regular intervals to rule out and LEEP surgery pathology showed positive margins.  Also she has.  Keeping the cervix is not generally advised for women with previously-abnormal  Paps.  Also, retaining the cervix might lead to minor spotting at the time of menses because there is uterine tissue in the cervix.  If she decides to have the cervix removed at a future date, it will be more challenging surgery because the bladder may adhere to the back side of it, requiring surgical expertise at another institution to perform a trachelectomy.  She was advised to read more about this on line.  I explained that sometimes it is quite difficult to remove the cervix especially if there has been previous surgery such as a  section. If we stop short of removing the cervix it would be due to a safety concern such as excessive bleeding.     PLAN REGARDING OVARIES:   Keep intact if possible.  She knows that if we see something very suspicious for an abnormality on the ovary that we might remove 1 or both but it is extremely unlikely since the ultrasound was reassuring.    We had a discussion about whether to keep the ovaries or remove them with surgery.  She knows that by keeping the ovaries, there is a 5% chance of needing another surgery in the future to remove one or both if a cyst or cancer develops.  If the ovaries are taken, then menopause ensues.  We discussed the symptoms and risks of this, including risks of taking hormone replacement.    Bladder symptoms:   There are no   symptoms of stress urinary incontinence.      PLAN REGARDING FALLOPIAN TUBES:           We discussed that recent evidence has shown that removal of the fallopian tubes reduces the risk of ovarian cancer.  Therefore, I usually advise removing the tubes if they are accessible  but only if the procedure does not involve excessive risk due to adhesions or some other anatomical consideration.       RISKS DISCUSSION:         She was given several  detailed pamphlets  describing the surgery and the more common risks involved.  We discussed the risks, which include but are not limited to bleeding, infection, possible injury to  the organs, including the bowel, bladder, ureters and other internal organs, possible need for cystoscopy, possible vesicovaginal fistula or rectovaginal fistula formation, possible nerve paresthesias due to retractor placement, possible changes in sexual function and changes in urination and anesthesia risks.   After answering her questions, I handed her a consent form and allowed her to review it thoroughly.  She was offered a second opinion and declined.   She understands that if a bladder injury is discovered it will be addressed but she may need to go home with a Sanchez catheter in her bladder for one or more weeks to allow the bladder or other tissues to heal.  There is the option of simply starting with an open surgery which might lower her risk of bladder injury but she does want a laparoscopy even if it means slightly higher risk of bladder injury.The risk of bladder injury with open surgery is generally 1-2% and the risk with laparoscopy can be anywhere from 2-5%. We usually plan this surgery for a day when our urologist is present but there are infrequent occasions when the urologist has changed plans and is not available that day. I will likely perform a cystoscopy to look into the bladder after the surgery to inspect the ureter openings for urine flow and also to make sure the bladder has no lacerations or sutures present. Sometimes that is difficult to tell even with cystoscopy.She signed the form witnessed by my nurse.  I signed it as well.     HOME SUPPORT:  She understands that she will need some help around the home for the first several weeks after surgery.    EXPECTATIONS:  I told her that while most surgeries go smoothly, a perfectly-smooth course should not be assumed.  She should expect some form of inconvenience along the way.  It might be a minor wound infection or a slow return of bowel function or perhaps a more serious concern, such as pneumonia or blood clot.  At any rate, I and my  partners will follow her through these unanticipated obstacles.  She knows to expect 3 or 4 days in the hospital if open laparotomy is done and 6 weeks at home recuperating and 3-4 months until full recovery occurs. If laparoscopy is successful she may be discharged to home as same-day surgery. She will need to limit lifting to 20 pounds for 6-12 weeks after surgery depending upon what is done.  I advised her to limit visitors on the first hospital day to allow her pain medications to work more effectively.  She knows to be n.p.o. 8 hours before the surgery.  She will have preoperative laboratory work.  If it is abnormal, I may choose to postpone her surgery.        After discussing these issues today and on previous occasions, I feel that she has a very-thorough understanding of the indications for surgery and the possible risks associated with it.             RACHELLE Alegria MD

## 2022-10-28 ENCOUNTER — LAB (OUTPATIENT)
Dept: LAB | Facility: CLINIC | Age: 32
End: 2022-10-28
Payer: COMMERCIAL

## 2022-10-28 DIAGNOSIS — Z01.818 PREOP GENERAL PHYSICAL EXAM: ICD-10-CM

## 2022-10-28 LAB
CREAT SERPL-MCNC: 0.63 MG/DL (ref 0.52–1.04)
GFR SERPL CREATININE-BSD FRML MDRD: >90 ML/MIN/1.73M2

## 2022-10-28 PROCEDURE — 86850 RBC ANTIBODY SCREEN: CPT

## 2022-10-28 PROCEDURE — U0005 INFEC AGEN DETEC AMPLI PROBE: HCPCS

## 2022-10-28 PROCEDURE — 36415 COLL VENOUS BLD VENIPUNCTURE: CPT

## 2022-10-28 PROCEDURE — U0003 INFECTIOUS AGENT DETECTION BY NUCLEIC ACID (DNA OR RNA); SEVERE ACUTE RESPIRATORY SYNDROME CORONAVIRUS 2 (SARS-COV-2) (CORONAVIRUS DISEASE [COVID-19]), AMPLIFIED PROBE TECHNIQUE, MAKING USE OF HIGH THROUGHPUT TECHNOLOGIES AS DESCRIBED BY CMS-2020-01-R: HCPCS

## 2022-10-28 PROCEDURE — 86901 BLOOD TYPING SEROLOGIC RH(D): CPT

## 2022-10-28 PROCEDURE — 86900 BLOOD TYPING SEROLOGIC ABO: CPT

## 2022-10-28 PROCEDURE — 82565 ASSAY OF CREATININE: CPT

## 2022-10-29 LAB — SARS-COV-2 RNA RESP QL NAA+PROBE: NEGATIVE

## 2022-10-31 ENCOUNTER — ANESTHESIA EVENT (OUTPATIENT)
Dept: SURGERY | Facility: CLINIC | Age: 32
End: 2022-10-31
Payer: COMMERCIAL

## 2022-10-31 ENCOUNTER — ANESTHESIA (OUTPATIENT)
Dept: SURGERY | Facility: CLINIC | Age: 32
End: 2022-10-31
Payer: COMMERCIAL

## 2022-10-31 ENCOUNTER — HOSPITAL ENCOUNTER (OUTPATIENT)
Facility: CLINIC | Age: 32
Discharge: HOME | End: 2022-10-31
Attending: OBSTETRICS & GYNECOLOGY | Admitting: OBSTETRICS & GYNECOLOGY
Payer: COMMERCIAL

## 2022-10-31 ENCOUNTER — SURGERY (OUTPATIENT)
Age: 32
End: 2022-10-31
Payer: COMMERCIAL

## 2022-10-31 VITALS
SYSTOLIC BLOOD PRESSURE: 110 MMHG | OXYGEN SATURATION: 97 % | DIASTOLIC BLOOD PRESSURE: 67 MMHG | HEART RATE: 73 BPM | BODY MASS INDEX: 22.69 KG/M2 | WEIGHT: 141.2 LBS | RESPIRATION RATE: 16 BRPM | TEMPERATURE: 97.9 F | HEIGHT: 66 IN

## 2022-10-31 DIAGNOSIS — Z90.710 S/P HYSTERECTOMY: Primary | ICD-10-CM

## 2022-10-31 LAB — HCG UR QL: NEGATIVE

## 2022-10-31 PROCEDURE — 272N000001 HC OR GENERAL SUPPLY STERILE: Performed by: OBSTETRICS & GYNECOLOGY

## 2022-10-31 PROCEDURE — 250N000011 HC RX IP 250 OP 636: Performed by: NURSE ANESTHETIST, CERTIFIED REGISTERED

## 2022-10-31 PROCEDURE — 250N000009 HC RX 250: Performed by: NURSE ANESTHETIST, CERTIFIED REGISTERED

## 2022-10-31 PROCEDURE — 710N000010 HC RECOVERY PHASE 1, LEVEL 2, PER MIN: Performed by: OBSTETRICS & GYNECOLOGY

## 2022-10-31 PROCEDURE — 370N000017 HC ANESTHESIA TECHNICAL FEE, PER MIN: Performed by: OBSTETRICS & GYNECOLOGY

## 2022-10-31 PROCEDURE — 250N000011 HC RX IP 250 OP 636: Performed by: OBSTETRICS & GYNECOLOGY

## 2022-10-31 PROCEDURE — 88307 TISSUE EXAM BY PATHOLOGIST: CPT | Mod: 26 | Performed by: PATHOLOGY

## 2022-10-31 PROCEDURE — 999N000141 HC STATISTIC PRE-PROCEDURE NURSING ASSESSMENT: Performed by: OBSTETRICS & GYNECOLOGY

## 2022-10-31 PROCEDURE — 58571 TLH W/T/O 250 G OR LESS: CPT | Performed by: OBSTETRICS & GYNECOLOGY

## 2022-10-31 PROCEDURE — 81025 URINE PREGNANCY TEST: CPT | Performed by: OBSTETRICS & GYNECOLOGY

## 2022-10-31 PROCEDURE — 58571 TLH W/T/O 250 G OR LESS: CPT | Mod: 80 | Performed by: OBSTETRICS & GYNECOLOGY

## 2022-10-31 PROCEDURE — 258N000003 HC RX IP 258 OP 636: Performed by: NURSE ANESTHETIST, CERTIFIED REGISTERED

## 2022-10-31 PROCEDURE — 360N000077 HC SURGERY LEVEL 4, PER MIN: Performed by: OBSTETRICS & GYNECOLOGY

## 2022-10-31 PROCEDURE — 710N000012 HC RECOVERY PHASE 2, PER MINUTE: Performed by: OBSTETRICS & GYNECOLOGY

## 2022-10-31 PROCEDURE — 250N000013 HC RX MED GY IP 250 OP 250 PS 637: Performed by: NURSE ANESTHETIST, CERTIFIED REGISTERED

## 2022-10-31 PROCEDURE — 250N000026 HC DESFLURANE, PER MIN: Performed by: OBSTETRICS & GYNECOLOGY

## 2022-10-31 PROCEDURE — 250N000009 HC RX 250: Performed by: OBSTETRICS & GYNECOLOGY

## 2022-10-31 PROCEDURE — 88307 TISSUE EXAM BY PATHOLOGIST: CPT | Mod: TC | Performed by: OBSTETRICS & GYNECOLOGY

## 2022-10-31 RX ORDER — ONDANSETRON 4 MG/1
4 TABLET, ORALLY DISINTEGRATING ORAL EVERY 8 HOURS PRN
Qty: 4 TABLET | Refills: 0 | Status: SHIPPED | OUTPATIENT
Start: 2022-10-31 | End: 2024-07-25

## 2022-10-31 RX ORDER — ACETAMINOPHEN 325 MG/1
975 TABLET ORAL ONCE
Status: DISCONTINUED | OUTPATIENT
Start: 2022-10-31 | End: 2022-10-31 | Stop reason: HOSPADM

## 2022-10-31 RX ORDER — AMOXICILLIN 250 MG
1-2 CAPSULE ORAL 2 TIMES DAILY
Qty: 30 TABLET | Refills: 0 | Status: SHIPPED | OUTPATIENT
Start: 2022-10-31 | End: 2024-07-25

## 2022-10-31 RX ORDER — ONDANSETRON 4 MG/1
4 TABLET, ORALLY DISINTEGRATING ORAL EVERY 30 MIN PRN
Status: DISCONTINUED | OUTPATIENT
Start: 2022-10-31 | End: 2022-10-31 | Stop reason: HOSPADM

## 2022-10-31 RX ORDER — KETOROLAC TROMETHAMINE 30 MG/ML
30 INJECTION, SOLUTION INTRAMUSCULAR; INTRAVENOUS ONCE
Status: COMPLETED | OUTPATIENT
Start: 2022-10-31 | End: 2022-10-31

## 2022-10-31 RX ORDER — PROPOFOL 10 MG/ML
INJECTION, EMULSION INTRAVENOUS CONTINUOUS PRN
Status: DISCONTINUED | OUTPATIENT
Start: 2022-10-31 | End: 2022-10-31

## 2022-10-31 RX ORDER — SODIUM CHLORIDE, SODIUM LACTATE, POTASSIUM CHLORIDE, CALCIUM CHLORIDE 600; 310; 30; 20 MG/100ML; MG/100ML; MG/100ML; MG/100ML
INJECTION, SOLUTION INTRAVENOUS CONTINUOUS
Status: DISCONTINUED | OUTPATIENT
Start: 2022-10-31 | End: 2022-10-31 | Stop reason: HOSPADM

## 2022-10-31 RX ORDER — OXYCODONE HYDROCHLORIDE 5 MG/1
5-10 TABLET ORAL EVERY 6 HOURS PRN
Qty: 12 TABLET | Refills: 0 | Status: SHIPPED | OUTPATIENT
Start: 2022-10-31 | End: 2024-07-25

## 2022-10-31 RX ORDER — FENTANYL CITRATE 50 UG/ML
50 INJECTION, SOLUTION INTRAMUSCULAR; INTRAVENOUS
Status: DISCONTINUED | OUTPATIENT
Start: 2022-10-31 | End: 2022-10-31 | Stop reason: HOSPADM

## 2022-10-31 RX ORDER — ONDANSETRON 2 MG/ML
4 INJECTION INTRAMUSCULAR; INTRAVENOUS EVERY 30 MIN PRN
Status: DISCONTINUED | OUTPATIENT
Start: 2022-10-31 | End: 2022-10-31 | Stop reason: HOSPADM

## 2022-10-31 RX ORDER — HYDROMORPHONE HYDROCHLORIDE 1 MG/ML
0.4 INJECTION, SOLUTION INTRAMUSCULAR; INTRAVENOUS; SUBCUTANEOUS EVERY 5 MIN PRN
Status: DISCONTINUED | OUTPATIENT
Start: 2022-10-31 | End: 2022-10-31 | Stop reason: HOSPADM

## 2022-10-31 RX ORDER — HYDROXYZINE HYDROCHLORIDE 50 MG/ML
50 INJECTION, SOLUTION INTRAMUSCULAR EVERY 6 HOURS PRN
Status: DISCONTINUED | OUTPATIENT
Start: 2022-10-31 | End: 2022-10-31 | Stop reason: HOSPADM

## 2022-10-31 RX ORDER — DIMENHYDRINATE 50 MG/ML
INJECTION, SOLUTION INTRAMUSCULAR; INTRAVENOUS PRN
Status: DISCONTINUED | OUTPATIENT
Start: 2022-10-31 | End: 2022-10-31

## 2022-10-31 RX ORDER — FENTANYL CITRATE 50 UG/ML
INJECTION, SOLUTION INTRAMUSCULAR; INTRAVENOUS PRN
Status: DISCONTINUED | OUTPATIENT
Start: 2022-10-31 | End: 2022-10-31

## 2022-10-31 RX ORDER — MEPERIDINE HYDROCHLORIDE 25 MG/ML
12.5 INJECTION INTRAMUSCULAR; INTRAVENOUS; SUBCUTANEOUS
Status: DISCONTINUED | OUTPATIENT
Start: 2022-10-31 | End: 2022-10-31 | Stop reason: HOSPADM

## 2022-10-31 RX ORDER — LIDOCAINE HYDROCHLORIDE 20 MG/ML
INJECTION, SOLUTION INFILTRATION; PERINEURAL PRN
Status: DISCONTINUED | OUTPATIENT
Start: 2022-10-31 | End: 2022-10-31

## 2022-10-31 RX ORDER — HYDRALAZINE HYDROCHLORIDE 20 MG/ML
2.5-5 INJECTION INTRAMUSCULAR; INTRAVENOUS EVERY 10 MIN PRN
Status: DISCONTINUED | OUTPATIENT
Start: 2022-10-31 | End: 2022-10-31 | Stop reason: HOSPADM

## 2022-10-31 RX ORDER — METOPROLOL TARTRATE 1 MG/ML
1-2 INJECTION, SOLUTION INTRAVENOUS EVERY 5 MIN PRN
Status: DISCONTINUED | OUTPATIENT
Start: 2022-10-31 | End: 2022-10-31 | Stop reason: HOSPADM

## 2022-10-31 RX ORDER — LIDOCAINE HYDROCHLORIDE AND EPINEPHRINE 10; 10 MG/ML; UG/ML
INJECTION, SOLUTION INFILTRATION; PERINEURAL PRN
Status: DISCONTINUED | OUTPATIENT
Start: 2022-10-31 | End: 2022-10-31 | Stop reason: HOSPADM

## 2022-10-31 RX ORDER — ALBUTEROL SULFATE 0.83 MG/ML
2.5 SOLUTION RESPIRATORY (INHALATION) EVERY 4 HOURS PRN
Status: DISCONTINUED | OUTPATIENT
Start: 2022-10-31 | End: 2022-10-31 | Stop reason: HOSPADM

## 2022-10-31 RX ORDER — OXYCODONE HYDROCHLORIDE 5 MG/1
5 TABLET ORAL EVERY 4 HOURS PRN
Status: DISCONTINUED | OUTPATIENT
Start: 2022-10-31 | End: 2022-10-31 | Stop reason: HOSPADM

## 2022-10-31 RX ORDER — CEFAZOLIN SODIUM/WATER 2 G/20 ML
2 SYRINGE (ML) INTRAVENOUS
Status: COMPLETED | OUTPATIENT
Start: 2022-10-31 | End: 2022-10-31

## 2022-10-31 RX ORDER — SCOLOPAMINE TRANSDERMAL SYSTEM 1 MG/1
1 PATCH, EXTENDED RELEASE TRANSDERMAL
Status: DISCONTINUED | OUTPATIENT
Start: 2022-10-31 | End: 2022-10-31 | Stop reason: HOSPADM

## 2022-10-31 RX ORDER — HYDROXYZINE HYDROCHLORIDE 25 MG/1
25 TABLET, FILM COATED ORAL
Status: DISCONTINUED | OUTPATIENT
Start: 2022-10-31 | End: 2022-10-31 | Stop reason: HOSPADM

## 2022-10-31 RX ORDER — IBUPROFEN 800 MG/1
800 TABLET, FILM COATED ORAL ONCE
Status: DISCONTINUED | OUTPATIENT
Start: 2022-10-31 | End: 2022-10-31 | Stop reason: HOSPADM

## 2022-10-31 RX ORDER — MEPERIDINE HYDROCHLORIDE 25 MG/ML
25-50 INJECTION INTRAMUSCULAR; INTRAVENOUS; SUBCUTANEOUS
Status: COMPLETED | OUTPATIENT
Start: 2022-10-31 | End: 2022-10-31

## 2022-10-31 RX ORDER — ONDANSETRON 2 MG/ML
INJECTION INTRAMUSCULAR; INTRAVENOUS PRN
Status: DISCONTINUED | OUTPATIENT
Start: 2022-10-31 | End: 2022-10-31

## 2022-10-31 RX ORDER — FENTANYL CITRATE 50 UG/ML
50 INJECTION, SOLUTION INTRAMUSCULAR; INTRAVENOUS EVERY 5 MIN PRN
Status: DISCONTINUED | OUTPATIENT
Start: 2022-10-31 | End: 2022-10-31 | Stop reason: HOSPADM

## 2022-10-31 RX ORDER — CEFAZOLIN SODIUM/WATER 2 G/20 ML
2 SYRINGE (ML) INTRAVENOUS SEE ADMIN INSTRUCTIONS
Status: DISCONTINUED | OUTPATIENT
Start: 2022-10-31 | End: 2022-10-31 | Stop reason: HOSPADM

## 2022-10-31 RX ORDER — PROPOFOL 10 MG/ML
INJECTION, EMULSION INTRAVENOUS PRN
Status: DISCONTINUED | OUTPATIENT
Start: 2022-10-31 | End: 2022-10-31

## 2022-10-31 RX ORDER — IBUPROFEN 600 MG/1
600 TABLET, FILM COATED ORAL EVERY 6 HOURS PRN
Qty: 60 TABLET | Refills: 1 | Status: SHIPPED | OUTPATIENT
Start: 2022-10-31 | End: 2024-07-25

## 2022-10-31 RX ADMIN — FENTANYL CITRATE 50 MCG: 50 INJECTION, SOLUTION INTRAMUSCULAR; INTRAVENOUS at 09:35

## 2022-10-31 RX ADMIN — SUGAMMADEX 200 MG: 100 INJECTION, SOLUTION INTRAVENOUS at 09:27

## 2022-10-31 RX ADMIN — ONDANSETRON 4 MG: 2 INJECTION INTRAMUSCULAR; INTRAVENOUS at 09:00

## 2022-10-31 RX ADMIN — HYDROMORPHONE HYDROCHLORIDE 0.5 MG: 1 INJECTION, SOLUTION INTRAMUSCULAR; INTRAVENOUS; SUBCUTANEOUS at 08:32

## 2022-10-31 RX ADMIN — HYDROMORPHONE HYDROCHLORIDE 0.5 MG: 1 INJECTION, SOLUTION INTRAMUSCULAR; INTRAVENOUS; SUBCUTANEOUS at 08:20

## 2022-10-31 RX ADMIN — LIDOCAINE HYDROCHLORIDE,EPINEPHRINE BITARTRATE 6 ML: 10; .01 INJECTION, SOLUTION INFILTRATION; PERINEURAL at 09:25

## 2022-10-31 RX ADMIN — SCOPALAMINE 1 PATCH: 1 PATCH, EXTENDED RELEASE TRANSDERMAL at 08:12

## 2022-10-31 RX ADMIN — HYDROXYZINE HYDROCHLORIDE 50 MG: 50 INJECTION, SOLUTION INTRAMUSCULAR at 10:13

## 2022-10-31 RX ADMIN — PROPOFOL 200 MG: 10 INJECTION, EMULSION INTRAVENOUS at 07:41

## 2022-10-31 RX ADMIN — MIDAZOLAM 2 MG: 1 INJECTION INTRAMUSCULAR; INTRAVENOUS at 07:34

## 2022-10-31 RX ADMIN — Medication 2 G: at 07:30

## 2022-10-31 RX ADMIN — PROPOFOL 50 MCG/KG/MIN: 10 INJECTION, EMULSION INTRAVENOUS at 07:40

## 2022-10-31 RX ADMIN — LIDOCAINE HYDROCHLORIDE 80 MG: 20 INJECTION, SOLUTION INFILTRATION; PERINEURAL at 07:40

## 2022-10-31 RX ADMIN — FENTANYL CITRATE 50 MCG: 50 INJECTION INTRAMUSCULAR; INTRAVENOUS at 10:18

## 2022-10-31 RX ADMIN — DIMENHYDRINATE 25 MG: 50 INJECTION, SOLUTION INTRAMUSCULAR; INTRAVENOUS at 07:47

## 2022-10-31 RX ADMIN — SODIUM CHLORIDE, POTASSIUM CHLORIDE, SODIUM LACTATE AND CALCIUM CHLORIDE: 600; 310; 30; 20 INJECTION, SOLUTION INTRAVENOUS at 06:44

## 2022-10-31 RX ADMIN — ROCURONIUM BROMIDE 30 MG: 50 INJECTION, SOLUTION INTRAVENOUS at 08:24

## 2022-10-31 RX ADMIN — OXYCODONE HYDROCHLORIDE 5 MG: 5 TABLET ORAL at 12:36

## 2022-10-31 RX ADMIN — MEPERIDINE HYDROCHLORIDE 25 MG: 25 INJECTION INTRAMUSCULAR; INTRAVENOUS; SUBCUTANEOUS at 10:12

## 2022-10-31 RX ADMIN — FENTANYL CITRATE 50 MCG: 50 INJECTION, SOLUTION INTRAMUSCULAR; INTRAVENOUS at 07:34

## 2022-10-31 RX ADMIN — KETOROLAC TROMETHAMINE 30 MG: 30 INJECTION, SOLUTION INTRAMUSCULAR; INTRAVENOUS at 10:00

## 2022-10-31 RX ADMIN — ROCURONIUM BROMIDE 50 MG: 50 INJECTION, SOLUTION INTRAVENOUS at 07:41

## 2022-10-31 RX ADMIN — FLUORESCEIN SODIUM 0.33 ML: 100 INJECTION, SOLUTION OPHTHALMIC at 09:14

## 2022-10-31 RX ADMIN — LIDOCAINE HYDROCHLORIDE 1 ML: 10 INJECTION, SOLUTION EPIDURAL; INFILTRATION; INTRACAUDAL; PERINEURAL at 06:44

## 2022-10-31 ASSESSMENT — ACTIVITIES OF DAILY LIVING (ADL)
ADLS_ACUITY_SCORE: 35

## 2022-10-31 ASSESSMENT — LIFESTYLE VARIABLES: TOBACCO_USE: 1

## 2022-10-31 NOTE — ANESTHESIA CARE TRANSFER NOTE
Patient: Destiny Vaughn    Procedure: Procedure(s):  HYSTERECTOMY, TOTAL, LAPAROSCOPIC, WITH BILATERAL SALPINGECTOMY  Cystoscopy       Diagnosis: Menorrhagia with regular cycle [N92.0]  Diagnosis Additional Information: No value filed.    Anesthesia Type:   General     Note:    Oropharynx: oropharynx clear of all foreign objects and spontaneously breathing  Level of Consciousness: drowsy  Oxygen Supplementation: face mask    Independent Airway: airway patency satisfactory and stable  Dentition: dentition unchanged  Vital Signs Stable: post-procedure vital signs reviewed and stable  Report to RN Given: handoff report given  Patient transferred to: PACU    Handoff Report: Identifed the Patient, Identified the Reponsible Provider, Reviewed the pertinent medical history, Discussed the surgical course, Reviewed Intra-OP anesthesia mangement and issues during anesthesia, Set expectations for post-procedure period and Allowed opportunity for questions and acknowledgement of understanding      Vitals:  Vitals Value Taken Time   /85 10/31/22 0950   Temp 97.52  F (36.4  C) 10/31/22 0955   Pulse 74 10/31/22 0955   Resp 16 10/31/22 0955   SpO2 100 % 10/31/22 0955   Vitals shown include unvalidated device data.    Electronically Signed By: LOLI Dunne CRNA  October 31, 2022  9:56 AM

## 2022-10-31 NOTE — ANESTHESIA POSTPROCEDURE EVALUATION
Patient: Destiny Vaughn    Procedure: Procedure(s):  HYSTERECTOMY, TOTAL, LAPAROSCOPIC, WITH BILATERAL SALPINGECTOMY  Cystoscopy       Anesthesia Type:  General    Note:  Disposition: Outpatient   Postop Pain Control: Uneventful            Sign Out: Well controlled pain   PONV: No   Neuro/Psych: Uneventful            Sign Out: Acceptable/Baseline neuro status   Airway/Respiratory: Uneventful            Sign Out: Acceptable/Baseline resp. status   CV/Hemodynamics: Uneventful            Sign Out: Acceptable CV status   Other NRE: NONE   DID A NON-ROUTINE EVENT OCCUR? No    Event details/Postop Comments:  Pt was happy with anesthesia care.  No complications.  I will follow up with the pt if needed.           Last vitals:  Vitals Value Taken Time   /83 10/31/22 1036   Temp 98.24  F (36.8  C) 10/31/22 1031   Pulse 55 10/31/22 1038   Resp 13 10/31/22 1038   SpO2 97 % 10/31/22 1038   Vitals shown include unvalidated device data.    Electronically Signed By: LOLI Dunne CRNA  October 31, 2022  1:05 PM

## 2022-10-31 NOTE — OP NOTE
OPERATIVE NOTE    SURGEON: Vernon Alegria M.D.    ASSISTANT:   Vishal mccain MD    (The assistance of this surgeon was required because of the need for additional skilled surgical hands- see the attached operative description for other details)      PREOPERATIVE DIAGNOSIS:   1. Menorrhagia    2. Severe dysmenorrhea    3.  Cervical dysplasia    POSTOPERATIVE DIAGNOSIS:  Same       PROCEDURES:    1. Total laparoscopic hysterectomy   2. Laparoscopic bilateral salpingectomy     3. Cystoscopy      ANESTHESIA:  General endotracheal anesthesia    PATIENT PROFILE: 32-year-old female  3 para 3 with cervical dysplasia severe menorrhagia and dysmenorrhea.    OPERATIVE FINDINGS: The ovaries looked normal     The cystoscopy that was done after surgery was normal        OPERATIVE DESCRIPTION:  After the establishment of satisfactory general endotracheal anesthesia, the patient was prepped and draped in the usual fashion for laparoscopy and vaginal surgery. A Sanchez catheter was inserted in the urinary bladder. The [place] uterine manipulator was inserted in the uterus vaginally and then we directed our attention to the laparoscopy.     Dr. Mccain made a 5 mm incision  just below  the umbilicus and inserted a Veress needle and insufflated the abdomen with CO2 gas and then inserted a 5 mm bladed trocar and inserted the laparoscope to verify correct placement.    There was no evidence of trauma from the port insertion. We then inserted two 5 mm bladed trochars under direct visualization below the level of the umbilicus and laterally on either side. I then went across the mesosalpinx  on the left side and Dr. Mccain did the same thing on the right side  to isolate the fallopian tubes   from their attachments and then took the LigaSure device all the way down through the broad ligament and round ligament and then eventually to the anterior leaf of the broad ligament to mobilize the bladder flap off the lower uterine segment and  cervix.  My partner Dr. Edwards assisted me by using the LigaSure device as well as holding the tissues steady so that I could use the LigaSure device because we needed additional skilled surgical hands to hold the tissues to separate them properly and safely.  We  then isolated the uterine arteries bilaterally with the LigaSure device. I then cut away the colpotomy along the KOH cup beveled edge. I then removed the uterus through the colpotomy opening. I then placed a bulb syringe and the vaginal opening and irrigated with sterile saline in the pelvis. There were several small areas of bleeding on the vaginal cuff and these were cauterized with the LigaSure device.     I then decided to close the cuff vaginally and I used 0 Vicryl sutures at each corner of the vaginal incision. These were interrupted figure-of-eight sutures. I then did a running locked 0 Vicryl closure of the vaginal cuff. I then returned to the laparoscopy to examine the vaginal cuff closure laparoscopically.       I then inserted the laparoscope again and examined the vaginal cuff and all of the pedicles for hemostasis.  I irrigated the pelvic area with normal saline and approximately 100 cc was used and approximately 100 cc was recovered and accounted for. The vaginal cuff was intact and hemostasis was excellent from the laparoscopic examination view point.    At that point, I expressed all of the CO2 gas from the abdomen and removed all of the trochars and repaired each of the skin incisions with 4-0 Monocryl sutures in a subcuticular stitch. I then injected each of the incisions with 0.25% Marcaine with dilute epinephrine. A total of  10 cc was used.     I then performed cystoscopy and there was good urine flowing through both ureteral orifices and there was no evidence of any sutures in the bladder.       I saw fluroescein dye spilling out of both ureteral orifices.        She will be discharged home later today and I will call her in the next  several days for follow-up. She will be instructed to come to the emergency room acutely if she develops any vomiting, increased pain, heavy vaginal bleeding or other concerns.    The estimated blood loss was 50 cc. The final sponge and needle counts were reported to me as correct and there were no complications.         Vernon Alegria MD

## 2022-10-31 NOTE — ANESTHESIA PREPROCEDURE EVALUATION
Anesthesia Pre-Procedure Evaluation    Patient: Destiny Vaughn   MRN: [id number] : 1990        Procedure : Procedure(s):  HYSTERECTOMY, TOTAL, LAPAROSCOPIC, WITH BILATERAL SALPINGECTOMY          Past Medical History:   Diagnosis Date     Adjustment reaction with anxiety and depression 2011    Formatting of this note might be different from the original. postpartum     ASC-H Pap smear of cervix ( ASCUS, cannot exclude HGSIL) 2017     Cervical high risk HPV (human papillomavirus) test positive 2018: See problem list.      H/O colposcopy with cervical biopsy 2017    PAPO 2      Past Surgical History:   Procedure Laterality Date     NO HISTORY OF SURGERY        Allergies   Allergen Reactions     No Known Drug Allergies       Social History     Tobacco Use     Smoking status: Every Day     Packs/day: 0.50     Types: Cigarettes     Start date: 2014     Smokeless tobacco: Never   Substance Use Topics     Alcohol use: Yes     Alcohol/week: 5.0 standard drinks     Types: 5 Standard drinks or equivalent per week     Comment: 5 per week      Wt Readings from Last 1 Encounters:   10/31/22 64 kg (141 lb 3.2 oz)        Anesthesia Evaluation   Pt has had prior anesthetic. Type: Regional and MAC.    No history of anesthetic complications       ROS/MED HX  ENT/Pulmonary:     (+) tobacco use, Current use,     Neurologic:  - neg neurologic ROS     Cardiovascular:  - neg cardiovascular ROS   (+) -----No previous cardiac testing     METS/Exercise Tolerance:     Hematologic:     (+) anemia (when pregnant),     Musculoskeletal:  - neg musculoskeletal ROS     GI/Hepatic:  - neg GI/hepatic ROS  (-) GERD   Renal/Genitourinary:  - neg Renal ROS     Endo:  - neg endo ROS     Psychiatric/Substance Use:     (+) psychiatric history anxiety and depression     Infectious Disease:  - neg infectious disease ROS     Malignancy:  - neg malignancy ROS     Other:  - neg other ROS          Physical  Exam    Airway        Mallampati: II   TM distance: > 3 FB   Neck ROM: full   Mouth opening: > 3 cm    Respiratory Devices and Support         Dental  no notable dental history         Cardiovascular   cardiovascular exam normal       Rhythm and rate: regular and normal     Pulmonary   pulmonary exam normal        breath sounds clear to auscultation           OUTSIDE LABS:  CBC:   Lab Results   Component Value Date    WBC 6.7 09/12/2022    WBC 6.4 03/22/2022    HGB 13.4 09/12/2022    HGB 13.9 03/22/2022    HCT 38.8 09/12/2022    HCT 41.1 03/22/2022     09/12/2022     03/22/2022     BMP:   Lab Results   Component Value Date     03/22/2022    POTASSIUM 3.7 03/22/2022    CHLORIDE 106 03/22/2022    CO2 28 03/22/2022    BUN 11 03/22/2022    CR 0.63 10/28/2022    CR 0.68 03/22/2022    GLC 89 03/22/2022     COAGS: No results found for: PTT, INR, FIBR  POC:   Lab Results   Component Value Date    HCG Negative 10/31/2022     HEPATIC: No results found for: ALBUMIN, PROTTOTAL, ALT, AST, GGT, ALKPHOS, BILITOTAL, BILIDIRECT, SAGE  OTHER:   Lab Results   Component Value Date    PH 5.5 09/26/2001    DAVID 8.7 03/22/2022    TSH 1.92 09/12/2022    CRP <2.9 01/25/2015       Anesthesia Plan    ASA Status:  2   NPO Status:  NPO Appropriate    Anesthesia Type: General.     - Airway: ETT   Induction: Intravenous, Propofol.   Maintenance: Balanced.        Consents    Anesthesia Plan(s) and associated risks, benefits, and realistic alternatives discussed. Questions answered and patient/representative(s) expressed understanding.    - Discussed:     - Discussed with:  Patient      - Extended Intubation/Ventilatory Support Discussed: No.      - Patient is DNR/DNI Status: No    Use of blood products discussed: Yes.     - Discussed with: Patient.     - Consented: consented to blood products            Reason for refusal: other.     Postoperative Care    Pain management: IV analgesics, Oral pain medications.     - Plan for  long acting post-op opioid use   PONV prophylaxis: Ondansetron (or other 5HT-3), Meclizine or Dimenhydrinate, Scopolamine patch     Comments:    Other Comments: The risks and benefits of anesthesia, and the alternatives where applicable, have been discussed with the patient, and they wish to proceed.            LOLI Dunne CRNA

## 2022-10-31 NOTE — DISCHARGE INSTRUCTIONS
Instructions from Dr Alegria after your Total  Laparoscopic  Hysterectomy:    1.  You have an appointment to see Dr. Alegria for postoperative care on Wednesday, November 9 at 3 PM.  You must arrive by 2:40 PM..  He will also plan to call you tomorrow on the day after surgery to check on your progress.  The call will occur sometime during the day.    2. If you should develop any concerns, such as heavy vaginal bleeding, fever, vomiting or increasing pain, you should call his nurse at the above number to be worked in for an appointment, or go to the emergency room if after hours or on a weekend.    3. You should expect some pain for the first several weeks which should gradually decrease day by day. Use your pain medication as needed. The narcotic medication will likely  make you constipated so be somewhat careful about using this. In general we expect that you should NOT be using the narcotic medication after the first week after surgery is over.Try to use the ibuprofen first and then the narcotic if you need more pain relief.    4. Expect that you might see some vaginal bleeding- mostly spotting- but perhaps you may pass a clot or two in the first several weeks after surgery- This can actually occur anytime in the first 3 months after this type of surgery. Mild clotting/spotting is ok but something like a heavy menstrual period should definitely be reported right away.. If the bleeding seems to be more than that, please let Dr Alegria know about it. Also watch for signs of infection, which would be fever or redness at the incision sites if there are any incisions, or foul smelling vaginal discharge.    5. Avoid intercourse until your 6 week post-operative exam. Don't lift anything over 20 lbs for 12  weeks after your surgery.    6. If you have any questions or concerns please don't hesitate to contact Dr Alegria- call [phone].      7.You have been given a prescription for pyridium., These pills will  turn your urine bright orange. They help you with soothing the sting / burning with urination. You can take one pill up to three times a day- usually with meals- for up to 2 days. Usually the burning with urination resolves in the first day after surgery. Then you can stop the pills.    Also you have a prescription for Zofran to use(ondansetron) if you develop nausea from the oxycodone.  Try to stick to 1 oxycodone pill instead of 2 because it is less likely to make you nauseated.  Try to use the ibuprofen first if you have pain but only use the oxycodone if you have severe pain.         Vernon Alegria MD, FACOG, FAAFP              , OB/GYN  Department.       [facility]   Adult Discharge Orders & Instructions     For 24 hours after surgery    Get plenty of rest.  A responsible adult must stay with you for at least 24 hours after you leave the hospital.     Do not drive or use heavy equipment.  If you have weakness or tingling, don't drive or use heavy equipment until this feeling goes away.    Do not drink alcohol.    Avoid strenuous or risky activities.  Ask for help when climbing stairs.     You may feel lightheaded.  If so, sit for a few minutes before standing.  Have someone help you get up.     You may have a slight fever. Call the doctor if your fever is over 100 F (37.7 C) (taken under the tongue) or lasts longer than 24 hours.    You may have a dry mouth, a sore throat, muscle aches or trouble sleeping.  These should go away after 24 hours.    Do not make important or legal decisions.    Based on the surgery/procedure that you had today, we do not expect that you will have any problems.  However, we want you to know what to do if you have pain, nausea, bleeding, or infection:    To control pain:  Take medicines your physician has prescribed or or over-the counter medicine he or she advises.  Ice packs and periods of rest are often helpful.  For surgery on an arm or leg,  raise it on a pillow to ease swelling.  If your pain is not managed with the above methods, contact your physician.    Copyright [employer], Licensed under [employer]    To control nausea:  Take anti-nausea medicine approved by your physician.  Drink clear liquids such as apple juice, ginger ale, broth or 7-Up. Be sure to drink enough fluids.  Move to a regular diet as you feel able.  Rest may also help.        Copyright [employer], Licensed under [employer]    Infection: Please contact your physician if you have any of the following signs:  redness, swelling, heat, increasing pain or foul-smelling drainage at your surgery site, fever or chills.    Call your doctor for any of the followin.  It has been over 8 to 10 hours since surgery and you are still not able to urinate (pass water).    2.  Headache for over 24 hours.

## 2022-11-02 ENCOUNTER — TELEPHONE (OUTPATIENT)
Dept: FAMILY MEDICINE | Facility: CLINIC | Age: 32
End: 2022-11-02

## 2022-11-02 LAB
PATH REPORT.COMMENTS IMP SPEC: NORMAL
PATH REPORT.FINAL DX SPEC: NORMAL
PATH REPORT.GROSS SPEC: NORMAL
PATH REPORT.MICROSCOPIC SPEC OTHER STN: NORMAL
PATH REPORT.RELEVANT HX SPEC: NORMAL
PHOTO IMAGE: NORMAL

## 2022-11-02 NOTE — TELEPHONE ENCOUNTER
FMLA form faxed to us.  Form is on your desk.  Make sure I get this back so the right workflow gets done. :)

## 2022-11-09 ENCOUNTER — OFFICE VISIT (OUTPATIENT)
Dept: FAMILY MEDICINE | Facility: CLINIC | Age: 32
End: 2022-11-09
Payer: COMMERCIAL

## 2022-11-09 VITALS — BODY MASS INDEX: 22.07 KG/M2 | DIASTOLIC BLOOD PRESSURE: 79 MMHG | WEIGHT: 135.1 LBS | SYSTOLIC BLOOD PRESSURE: 112 MMHG

## 2022-11-09 DIAGNOSIS — Z98.890 POSTOPERATIVE STATE: Primary | ICD-10-CM

## 2022-11-09 PROCEDURE — 99024 POSTOP FOLLOW-UP VISIT: CPT | Performed by: OBSTETRICS & GYNECOLOGY

## 2022-11-09 NOTE — PROGRESS NOTES
Destiny is here for 9 day   postop check from laparoscopic  hysterectomy. She offers no concerns.  She reports adequate pain control and normal bowel and bladder function. No fevers.     Objective: Vital signs: Blood pressure 112/79, weight 61.3 kg (135 lb 1.6 oz), not currently breastfeeding.      Chest is clear to ausculatation. Cardiac exam is normal. Abdomen is soft and not distended. Normal bowel sounds heard. Incisions are clean and dry and intact-healing well.    Assessment: Normal 9 day  post op check.    Plan: pathology discussed with her. It was benign.   Recheck in  5 weeks  , sooner if wound becomes red or tender or fever or any other concerns. Limit lifting to 10 lbs for a total of 12 weeks.No intercourse until 6 weeks out from surgery.  RACHELLE Alegria MD

## 2022-11-10 NOTE — TELEPHONE ENCOUNTER
"Nicole from [employer] calling to speak to you the Form that was faxed back on this patient, the \"REASON\" on the Form is incorrect.      Please call Nicole at [phone] to discuss.    Mona CULVER     [facility]      "

## 2022-11-10 NOTE — TELEPHONE ENCOUNTER
Let me know when you see this message Dr. Alegria and I can explain what they want you to fill out.

## 2022-11-16 ENCOUNTER — MYC MEDICAL ADVICE (OUTPATIENT)
Dept: FAMILY MEDICINE | Facility: CLINIC | Age: 32
End: 2022-11-16

## 2022-12-07 ENCOUNTER — OFFICE VISIT (OUTPATIENT)
Dept: FAMILY MEDICINE | Facility: CLINIC | Age: 32
End: 2022-12-07
Payer: COMMERCIAL

## 2022-12-07 VITALS
WEIGHT: 138.19 LBS | SYSTOLIC BLOOD PRESSURE: 122 MMHG | HEIGHT: 66 IN | BODY MASS INDEX: 22.21 KG/M2 | RESPIRATION RATE: 16 BRPM | TEMPERATURE: 97.9 F | OXYGEN SATURATION: 100 % | HEART RATE: 77 BPM | DIASTOLIC BLOOD PRESSURE: 80 MMHG

## 2022-12-07 DIAGNOSIS — Z98.890 POSTOPERATIVE STATE: Primary | ICD-10-CM

## 2022-12-07 PROCEDURE — 99024 POSTOP FOLLOW-UP VISIT: CPT | Performed by: OBSTETRICS & GYNECOLOGY

## 2022-12-07 ASSESSMENT — PAIN SCALES - GENERAL: PAINLEVEL: NO PAIN (0)

## 2022-12-07 NOTE — PROGRESS NOTES
"Destiny is here for 6 week postop check from total laparoscopic  hysterectomy.The ovaries were  retained  . She offers no concerns.   She reports adequate pain control and normal bowel and bladder function.  No fevers.    Objective:  Vital signs are stable as shown in chart.  Blood pressure 122/80, pulse 77, temperature 97.9  F (36.6  C), temperature source Temporal, resp. rate 16, height 1.666 m (5' 5.6\"), weight 62.7 kg (138 lb 3 oz), SpO2 100 %, not currently breastfeeding.       Chest is clear to ausculatation. Cardiac exam is normal. Abdomen is soft and not distended. Normal bowel sounds heard. Incisions are clean and dry and intact-healing well. The pelvic exam reveals that the vaginal cuff is healing well. No other abnormal findings noted.   My nurse was present for the exam.      Assessment: Normal 6 week  post op check for total laparoscopic hysterectomy.     Plan: pathology discussed with her.  It was benign.     Recheck prn   ,or if wound becomes red or tender or fever or any other concerns. Limit lifting to 20 lbs for 12   full weeks. HRT not needed.    RACHELLE Alegria MD  "

## 2023-01-02 ENCOUNTER — VIRTUAL VISIT (OUTPATIENT)
Dept: FAMILY MEDICINE | Facility: CLINIC | Age: 33
End: 2023-01-02
Payer: COMMERCIAL

## 2023-01-02 DIAGNOSIS — Z98.890 POSTOPERATIVE STATE: Primary | ICD-10-CM

## 2023-01-02 PROCEDURE — 99024 POSTOP FOLLOW-UP VISIT: CPT | Performed by: OBSTETRICS & GYNECOLOGY

## 2023-01-02 NOTE — PROGRESS NOTES
Subjective: Destiny requested a phone consultation today because of concerns regarding postop check.     Overall she feels quite well.  She has been back to work and has no pain.  No vaginal bleeding.  She is voiding well.  No other concerns.    The past medical history and medications and allergies have been reviewed today by me.  .  Past Medical History:   Diagnosis Date     Adjustment reaction with anxiety and depression 08/11/2011    Formatting of this note might be different from the original. postpartum     ASC-H Pap smear of cervix ( ASCUS, cannot exclude HGSIL) 05/01/2017     Cervical high risk HPV (human papillomavirus) test positive 05/11/2018 05/11/18: See problem list.      H/O colposcopy with cervical biopsy 05/19/2017    PAPO 2     Allergies   Allergen Reactions     No Known Drug Allergies      Current Outpatient Medications   Medication Sig Dispense Refill     ibuprofen (ADVIL/MOTRIN) 600 MG tablet Take 1 tablet (600 mg) by mouth every 6 hours as needed for moderate pain (Patient not taking: Reported on 11/9/2022) 60 tablet 1     ondansetron (ZOFRAN ODT) 4 MG ODT tab Take 1 tablet (4 mg) by mouth every 8 hours as needed for nausea (Patient not taking: Reported on 11/9/2022) 4 tablet 0     oxyCODONE (ROXICODONE) 5 MG tablet Take 1-2 tablets (5-10 mg) by mouth every 6 hours as needed for moderate to severe pain (Patient not taking: Reported on 11/9/2022) 12 tablet 0     senna-docusate (SENOKOT-S/PERICOLACE) 8.6-50 MG tablet Take 1-2 tablets by mouth 2 times daily (Patient not taking: Reported on 11/9/2022) 30 tablet 0       Assessment/Plan:  Final postoperative check from hysterectomy.  Doing well.  No concerns at this point.  I encouraged her to contact me if she does develop any complications.      Phone call duration was   2  minutes.  Additional 1minutes spent in chart review today as well as charting.  This was all done today.    RACHELLE Alegria MD

## 2023-01-05 ENCOUNTER — PATIENT OUTREACH (OUTPATIENT)
Dept: FAMILY MEDICINE | Facility: CLINIC | Age: 33
End: 2023-01-05

## 2023-01-05 DIAGNOSIS — R87.611 PAP SMEAR OF CERVIX WITH ASCUS, CANNOT EXCLUDE HGSIL: ICD-10-CM

## 2023-01-05 NOTE — TELEPHONE ENCOUNTER
"Per forwarding comment from provider  \"Vernon Alegria MD Carda, Candace M, RN  Caller: Unspecified (Today,  8:02 AM)  Yes  Aidee, repeat Pap and cotesting in 1 year.  Thank you R MD Raji\"     "

## 2023-01-05 NOTE — TELEPHONE ENCOUNTER
"Danika Alegria,   Please review and advise.   33 yo female with available pap hx listed below. Hysterectomy with cervix removed on 10/31/22. Would you recommend patient repeat cotest of vaginal cuff in 1 yr or other recommendation?     Thank you,   Aidee Shaffer, BSN, RN-BC       2013 & 2014 NIL paps  5/1/17 ASC-H pap @ 27 yo. Plan: colp bef 8/1/17 5/19/17 colp PAPO 2, + HPV # 16. Plan: LEEP bef 8/19/17 03/13/18 LEEP not done, chart updated for LEEP/6mo pap. ([facility])   5/11/18: [place] [facility] benign NIL pap, + HR HPV # 16. Plan: cotest in 1 yr  3/8/19 NIL pap, + HR HPV # 16 (no 18 or other). Plan: colp.   11/5/19 Lost to follow-up for pap tracking  3/22/22 NIL pap, + HR HPV # 16. Plan: [place] bef 6/22/22 5/6/22 COLP bx: PAPO 3. Plan: LEEP   7/21/22 LEEP - rescheduled  8/3/22 LEEP @ 12 o'clock - \"Moderate and severe dysplastic change (PAPO-2-3) with extension into underlying endocervical glands, no evidence of invasive malignancy, resection margins free of dysplastic change\"   10/31/22  laparoscopic hysterectomy - Cervix no dysplasia. Plan: f/u on 11/17/22 12/7/22 post op visit.  1/2/23 post op call     "

## 2023-05-21 ENCOUNTER — HEALTH MAINTENANCE LETTER (OUTPATIENT)
Age: 33
End: 2023-05-21

## 2023-07-25 ENCOUNTER — OFFICE VISIT (OUTPATIENT)
Dept: FAMILY MEDICINE | Facility: CLINIC | Age: 33
End: 2023-07-25
Payer: COMMERCIAL

## 2023-07-25 VITALS
WEIGHT: 135 LBS | SYSTOLIC BLOOD PRESSURE: 106 MMHG | TEMPERATURE: 97.1 F | HEART RATE: 75 BPM | DIASTOLIC BLOOD PRESSURE: 72 MMHG | OXYGEN SATURATION: 100 % | BODY MASS INDEX: 21.69 KG/M2 | HEIGHT: 66 IN

## 2023-07-25 DIAGNOSIS — Z72.0 TOBACCO ABUSE: ICD-10-CM

## 2023-07-25 DIAGNOSIS — Z00.00 ANNUAL PHYSICAL EXAM: Primary | ICD-10-CM

## 2023-07-25 LAB
CHOLEST SERPL-MCNC: 170 MG/DL
HDLC SERPL-MCNC: 87 MG/DL
LDLC SERPL CALC-MCNC: 70 MG/DL
NONHDLC SERPL-MCNC: 83 MG/DL
TRIGL SERPL-MCNC: 64 MG/DL

## 2023-07-25 PROCEDURE — 80061 LIPID PANEL: CPT | Performed by: FAMILY MEDICINE

## 2023-07-25 PROCEDURE — 36415 COLL VENOUS BLD VENIPUNCTURE: CPT | Performed by: FAMILY MEDICINE

## 2023-07-25 PROCEDURE — 99395 PREV VISIT EST AGE 18-39: CPT | Performed by: FAMILY MEDICINE

## 2023-07-25 ASSESSMENT — ENCOUNTER SYMPTOMS
CONSTIPATION: 0
HEADACHES: 0
DYSURIA: 0
FREQUENCY: 0
BREAST MASS: 0
HEARTBURN: 0
EYE PAIN: 0
JOINT SWELLING: 0
WEAKNESS: 0
DIARRHEA: 0
NAUSEA: 0
MYALGIAS: 0
ABDOMINAL PAIN: 0
HEMATURIA: 0
NERVOUS/ANXIOUS: 0
ARTHRALGIAS: 0
HEMATOCHEZIA: 0
COUGH: 0
SORE THROAT: 0
PARESTHESIAS: 0
FEVER: 0
SHORTNESS OF BREATH: 0
DIZZINESS: 0
CHILLS: 0
PALPITATIONS: 0

## 2023-07-25 ASSESSMENT — PAIN SCALES - GENERAL: PAINLEVEL: NO PAIN (0)

## 2023-07-25 NOTE — PROGRESS NOTES
SUBJECTIVE:   CC: Destiny is an 32 year old who presents for preventive health visit.       7/25/2023     9:03 AM   Additional Questions   Roomed by Ryanne BORJAS     Healthy Habits:     Getting at least 3 servings of Calcium per day:  Yes    Bi-annual eye exam:  Yes    Dental care twice a year:  Yes    Sleep apnea or symptoms of sleep apnea:  None    Diet:  Regular (no restrictions)    Frequency of exercise:  None    Taking medications regularly:  Yes    Medication side effects:  Not applicable    Additional concerns today:  No      Today's PHQ-2 Score:       7/25/2023     8:48 AM   PHQ-2 ( 1999 [employer])   Q1: Little interest or pleasure in doing things 0   Q2: Feeling down, depressed or hopeless 0   PHQ-2 Score 0   Q1: Little interest or pleasure in doing things Not at all   Q2: Feeling down, depressed or hopeless Not at all   PHQ-2 Score 0             Social History     Tobacco Use    Smoking status: Every Day     Packs/day: 0.50     Types: Cigarettes     Start date: 4/19/2014    Smokeless tobacco: Never   Substance Use Topics    Alcohol use: Yes     Alcohol/week: 5.0 standard drinks of alcohol     Types: 5 Standard drinks or equivalent per week     Comment: 5 per week             7/25/2023     8:48 AM   Alcohol Use   Prescreen: >3 drinks/day or >7 drinks/week? No     Reviewed orders with patient.  Reviewed health maintenance and updated orders accordingly - Yes      Breast Cancer Screening:    FHS-7:       3/22/2022     7:48 AM 10/27/2022    10:16 AM 7/25/2023     8:49 AM   Breast CA Risk Assessment (FHS-7)   Did any of your first-degree relatives have breast or ovarian cancer? Unknown Yes Unknown   Did any of your relatives have bilateral breast cancer? Unknown Unknown Unknown   Did any man in your family have breast cancer? No No No   Did any woman in your family have breast and ovarian cancer? Unknown Unknown Unknown   Did any woman in your family have breast cancer before age 50 y? Unknown No Unknown   Do you have  "2 or more relatives with breast and/or ovarian cancer? Yes No Yes   Do you have 2 or more relatives with breast and/or bowel cancer? Yes No Unknown         Pertinent mammograms are reviewed under the imaging tab.    History of abnormal Pap smear: Status post benign hysterectomy. Health Maintenance and Surgical History updated.      Latest Ref Rng & Units 3/22/2022     9:45 AM 3/8/2019     2:17 PM 3/8/2019     2:05 PM   PAP / HPV   PAP  Negative for Intraepithelial Lesion or Malignancy (NILM)      PAP (Historical)    NIL    HPV 16 DNA Negative Positive  Positive     HPV 18 DNA Negative Negative  Negative     Other HR HPV Negative Negative  Negative       Reviewed and updated as needed this visit by clinical staff   Tobacco  Allergies  Meds              Reviewed and updated as needed this visit by Provider                     Review of Systems   Constitutional:  Negative for chills and fever.   HENT:  Negative for congestion, ear pain, hearing loss and sore throat.    Eyes:  Negative for pain and visual disturbance.   Respiratory:  Negative for cough and shortness of breath.    Cardiovascular:  Negative for chest pain, palpitations and peripheral edema.   Gastrointestinal:  Negative for abdominal pain, constipation, diarrhea, heartburn, hematochezia and nausea.   Breasts:  Negative for tenderness, breast mass and discharge.   Genitourinary:  Negative for dysuria, frequency, genital sores, hematuria, pelvic pain, urgency, vaginal bleeding and vaginal discharge.   Musculoskeletal:  Negative for arthralgias, joint swelling and myalgias.   Skin:  Negative for rash.   Neurological:  Negative for dizziness, weakness, headaches and paresthesias.   Psychiatric/Behavioral:  Negative for mood changes. The patient is not nervous/anxious.           OBJECTIVE:   /72   Pulse 75   Temp 97.1  F (36.2  C) (Temporal)   Ht 1.684 m (5' 6.3\")   Wt 61.2 kg (135 lb)   SpO2 100%   BMI 21.59 kg/m    Physical Exam  GENERAL: " healthy, alert and no distress  NECK: no adenopathy, no asymmetry, masses, or scars and thyroid normal to palpation  RESP: lungs clear to auscultation - no rales, rhonchi or wheezes  CV: regular rate and rhythm, normal S1 S2, no S3 or S4, no murmur, click or rub, no peripheral edema and peripheral pulses strong  ABDOMEN: soft, nontender, no hepatosplenomegaly, no masses and bowel sounds normal  MS: no gross musculoskeletal defects noted, no edema    Diagnostic Test Results:  Labs reviewed in Epic    ASSESSMENT/PLAN:       ICD-10-CM    1. Annual physical exam  Z00.00 Lipid panel reflex to direct LDL Fasting     Lipid panel reflex to direct LDL Fasting      2. Tobacco abuse  Z72.0         Annual topics covered  Smoking cessation discussed.  She is considering.  Follow-up lab work  Form signed    Patient has been advised of split billing requirements and indicates understanding: Yes      COUNSELING:  Reviewed preventive health counseling, as reflected in patient instructions        She reports that she has been smoking cigarettes. She started smoking about 9 years ago. She has been smoking an average of .5 packs per day. She has never used smokeless tobacco.  Nicotine/Tobacco Cessation Plan:   Information offered: Patient not interested at this time quit plan info given, discussed eds          Mohinder Abreu MD  [facility]

## 2023-10-10 ENCOUNTER — PATIENT OUTREACH (OUTPATIENT)
Dept: FAMILY MEDICINE | Facility: CLINIC | Age: 33
End: 2023-10-10
Payer: COMMERCIAL

## 2023-10-10 DIAGNOSIS — R87.611 PAP SMEAR OF CERVIX WITH ASCUS, CANNOT EXCLUDE HGSIL: ICD-10-CM

## 2023-10-10 DIAGNOSIS — R87.810 CERVICAL HIGH RISK HPV (HUMAN PAPILLOMAVIRUS) TEST POSITIVE: Primary | ICD-10-CM

## 2023-10-10 DIAGNOSIS — Z87.410 HISTORY OF CERVICAL DYSPLASIA: ICD-10-CM

## 2023-12-12 NOTE — TELEPHONE ENCOUNTER
"FYI to provider - Patient is lost to pap tracking follow-up. Attempts to contact pt have been made per reminder process and there has been no reply and/or no appt scheduled. Contact hx listed below.     2013 & 2014 NIL paps  5/1/17 ASC-H pap @ 25 yo. Plan: colp bef 8/1/17 5/19/17 colp PAPO 2, + HPV # 16. Plan: LEEP bef 8/19/17 03/13/18 LEEP not done, chart updated for LEEP/6mo pap. ([facility])   5/11/18: [place] ECC benign NIL pap, + HR HPV # 16. Plan: cotest in 1 yr  3/8/19 NIL pap, + HR HPV # 16 (no 18 or other). Plan: colp.   11/5/19 Lost to follow-up for pap tracking  3/22/22 NIL pap, + HR HPV # 16. Plan: [place] bef 6/22/22 5/6/22 COLP bx: PAPO 3. Plan: LEEP   7/21/22 LEEP - rescheduled  8/3/22 LEEP @ 12 o'clock - \"Moderate and severe dysplastic change (PAPO-2-3) with extension into underlying endocervical glands, no evidence of invasive malignancy, resection margins free of dysplastic change\"   10/31/22  laparoscopic hysterectomy - Cervix no dysplasia. Plan: f/u on 11/17/22 12/7/22 post op visit.  1/2/23 post op call   1/5/23 Pt outreach encounter to provider. Plan: Vaginal cotest in 1 yr.   10/10/23 Reminder Avahart  11/9/23 Reminder call - lm  12/12/23 Lost to follow-up for pap tracking  "

## 2024-03-27 ENCOUNTER — TELEPHONE (OUTPATIENT)
Dept: FAMILY MEDICINE | Facility: CLINIC | Age: 34
End: 2024-03-27
Payer: COMMERCIAL

## 2024-03-27 NOTE — TELEPHONE ENCOUNTER
Patient rescheduled for annual physical. She needed it to be at the earliest 7/26/24 but no later than the end of the month in July.     Elle Merchant RN on 3/27/2024 at 5:00 PM

## 2024-03-27 NOTE — TELEPHONE ENCOUNTER
Called and LM for patient to call back, meena sent as well. Please let patient know that her appointment on 7/26/2024 needs to be rescheduled due to Ryanne Terrazas no longer going to be in clinic.     Laura Fong MA

## 2024-07-24 ENCOUNTER — TELEPHONE (OUTPATIENT)
Dept: FAMILY MEDICINE | Facility: CLINIC | Age: 34
End: 2024-07-24

## 2024-07-24 NOTE — TELEPHONE ENCOUNTER
Reason for Call:  Appointment Request    Patient requesting this type of appt:  Preventive     Requested provider: any provider    Reason patient unable to be scheduled: Not within requested timeframe    When does patient want to be seen/preferred time: 3-7 days  Comments: The patient had an annual scheduled cancelled due to provider out. She needs it before 7/31/24 for work.    Could we send this information to you in [facility] or would you prefer to receive a phone call?:   Patient would prefer a phone call   Okay to leave a detailed message?: Yes at Home number on file [phone] (home)    Call taken on 7/24/2024 at 6:00 PM by Jocelynn Roberts LPN

## 2024-07-25 ENCOUNTER — OFFICE VISIT (OUTPATIENT)
Dept: FAMILY MEDICINE | Facility: CLINIC | Age: 34
End: 2024-07-25
Payer: COMMERCIAL

## 2024-07-25 VITALS
BODY MASS INDEX: 21.44 KG/M2 | HEIGHT: 66 IN | SYSTOLIC BLOOD PRESSURE: 118 MMHG | TEMPERATURE: 97.3 F | WEIGHT: 133.4 LBS | RESPIRATION RATE: 13 BRPM | HEART RATE: 68 BPM | DIASTOLIC BLOOD PRESSURE: 62 MMHG | OXYGEN SATURATION: 99 %

## 2024-07-25 DIAGNOSIS — Z00.00 ROUTINE GENERAL MEDICAL EXAMINATION AT A HEALTH CARE FACILITY: Primary | ICD-10-CM

## 2024-07-25 DIAGNOSIS — Z87.410 HISTORY OF CERVICAL DYSPLASIA: ICD-10-CM

## 2024-07-25 PROBLEM — R87.611 PAP SMEAR OF CERVIX WITH ASCUS, CANNOT EXCLUDE HGSIL: Status: RESOLVED | Noted: 2017-05-01 | Resolved: 2024-07-25

## 2024-07-25 PROBLEM — N87.9 DYSPLASIA OF CERVIX: Status: RESOLVED | Noted: 2022-08-03 | Resolved: 2024-07-25

## 2024-07-25 PROBLEM — R87.810 CERVICAL HIGH RISK HPV (HUMAN PAPILLOMAVIRUS) TEST POSITIVE: Status: RESOLVED | Noted: 2017-05-19 | Resolved: 2024-07-25

## 2024-07-25 PROCEDURE — 99395 PREV VISIT EST AGE 18-39: CPT | Performed by: PHYSICIAN ASSISTANT

## 2024-07-25 SDOH — HEALTH STABILITY: PHYSICAL HEALTH: ON AVERAGE, HOW MANY DAYS PER WEEK DO YOU ENGAGE IN MODERATE TO STRENUOUS EXERCISE (LIKE A BRISK WALK)?: 1 DAY

## 2024-07-25 SDOH — HEALTH STABILITY: PHYSICAL HEALTH: ON AVERAGE, HOW MANY MINUTES DO YOU ENGAGE IN EXERCISE AT THIS LEVEL?: 20 MIN

## 2024-07-25 ASSESSMENT — SOCIAL DETERMINANTS OF HEALTH (SDOH): HOW OFTEN DO YOU GET TOGETHER WITH FRIENDS OR RELATIVES?: MORE THAN THREE TIMES A WEEK

## 2024-07-25 NOTE — PROGRESS NOTES
Preventive Care Visit  [facility]  Ryanne Terrazas PA-C, Family Medicine  Jul 25, 2024      Marlon Dixon is a 33 year old, presenting for the following:  Physical        7/25/2024    10:16 AM   Additional Questions   Roomed by Anna        Health Care Directive  Patient does not have a Health Care Directive or Living Will: Discussed advance care planning with patient; information given to patient to review.    HPI        7/25/2024   General Health   How would you rate your overall physical health? Good   Feel stress (tense, anxious, or unable to sleep) Not at all            7/25/2024   Nutrition   Three or more servings of calcium each day? Yes   Diet: Regular (no restrictions)   How many servings of fruit and vegetables per day? (!) 0-1   How many sweetened beverages each day? 0-1            7/25/2024   Exercise   Days per week of moderate/strenous exercise 1 day   Average minutes spent exercising at this level 20 min      (!) EXERCISE CONCERN      7/25/2024   Social Factors   Frequency of gathering with friends or relatives More than three times a week   Worry food won't last until get money to buy more No   Food not last or not have enough money for food? No   Do you have housing? (Housing is defined as stable permanent housing and does not include staying ouside in a car, in a tent, in an abandoned building, in an overnight shelter, or couch-surfing.) Yes   Are you worried about losing your housing? No   Lack of transportation? No   Unable to get utilities (heat,electricity)? No            7/25/2024   Dental   Dentist two times every year? (!) NO            7/25/2024   TB Screening   Were you born outside of the [place]? No            Today's PHQ-2 Score:       7/25/2024    10:17 AM   PHQ-2 ( 1999 [employer])   Q1: Little interest or pleasure in doing things 0   Q2: Feeling down, depressed or hopeless 0   PHQ-2 Score 0   Q1: Little interest or pleasure in doing things Not at all    Q2: Feeling down, depressed or hopeless Not at all   PHQ-2 Score 0           7/25/2024   Substance Use   Alcohol more than 3/day or more than 7/wk No   Do you use any other substances recreationally? (!) OTHER        Social History     Tobacco Use    Smoking status: Every Day     Current packs/day: 0.50     Average packs/day: 0.5 packs/day for 10.3 years (5.1 ttl pk-yrs)     Types: Cigarettes     Start date: 4/19/2014    Smokeless tobacco: Never   Vaping Use    Vaping status: Never Used   Substance Use Topics    Alcohol use: Yes     Alcohol/week: 5.0 standard drinks of alcohol     Types: 5 Standard drinks or equivalent per week     Comment: 5 per week    Drug use: No           7/25/2023   LAST FHS-7 RESULTS   1st degree relative breast or ovarian cancer Unknown   Any relative bilateral breast cancer Unknown   Any male have breast cancer No   Any ONE woman have BOTH breast AND ovarian cancer Unknown   Any woman with breast cancer before 50yrs Unknown   2 or more relatives with breast AND/OR ovarian cancer Yes   2 or more relatives with breast AND/OR bowel cancer Unknown           Mammogram Screening - Patient under 40 years of age: Routine Mammogram Screening not recommended.         7/25/2024   STI Screening   New sexual partner(s) since last STI/HIV test? (!) YES         History of abnormal Pap smear: Status post hysterectomy.         Latest Ref Rng & Units 3/22/2022     9:45 AM 3/8/2019     2:17 PM 3/8/2019     2:05 PM   PAP / HPV   PAP  Negative for Intraepithelial Lesion or Malignancy (NILM)      PAP (Historical)    NIL    HPV 16 DNA Negative Positive  Positive     HPV 18 DNA Negative Negative  Negative     Other HR HPV Negative Negative  Negative            Reviewed and updated as needed this visit by Provider                    BP Readings from Last 3 Encounters:   07/25/24 118/62   07/25/23 106/72   12/07/22 122/80    Wt Readings from Last 3 Encounters:   07/25/24 60.5 kg (133 lb 6.4 oz)   07/25/23 61.2 kg  "(135 lb)   12/07/22 62.7 kg (138 lb 3 oz)                  Patient Active Problem List   Diagnosis    History of cervical dysplasia     Past Surgical History:   Procedure Laterality Date    CYSTOSCOPY N/A 10/31/2022    Procedure: Cystoscopy;  Surgeon: Vernon Alegria MD;  Location: [facility]    HYSTERECTOMY, PAP NO LONGER INDICATED      LAPAROSCOPIC HYSTERECTOMY TOTAL, SALPINGECTOMY BILATERAL Bilateral 10/31/2022    Procedure: HYSTERECTOMY, TOTAL, LAPAROSCOPIC, WITH BILATERAL SALPINGECTOMY;  Surgeon: Vernon Alegria MD;  Location:  OR       Social History     Tobacco Use    Smoking status: Every Day     Current packs/day: 0.50     Average packs/day: 0.5 packs/day for 10.3 years (5.1 ttl pk-yrs)     Types: Cigarettes     Start date: 4/19/2014    Smokeless tobacco: Never   Substance Use Topics    Alcohol use: Yes     Alcohol/week: 5.0 standard drinks of alcohol     Types: 5 Standard drinks or equivalent per week     Comment: 5 per week     Family History   Problem Relation Age of Onset    Hypertension Father     Breast Cancer Maternal Grandmother         50-60    Obesity Maternal Grandfather     Diabetes Paternal Grandmother     Obesity Paternal Grandmother     Cancer Paternal Grandfather     Lung Cancer Paternal Grandfather     Bladder Cancer Paternal Grandfather     Breast Cancer Maternal Aunt 50    Kidney Disease Maternal Aunt          No current outpatient medications on file.         Review of Systems  Constitutional, HEENT, cardiovascular, pulmonary, GI, , musculoskeletal, neuro, skin, endocrine and psych systems are negative, except as otherwise noted.     Objective    Exam  /62   Pulse 68   Temp 97.3  F (36.3  C) (Temporal)   Resp 13   Ht 1.68 m (5' 6.14\")   Wt 60.5 kg (133 lb 6.4 oz)   LMP  (LMP Unknown)   SpO2 99%   BMI 21.44 kg/m     Estimated body mass index is 21.44 kg/m  as calculated from the following:    Height as of this encounter: 1.68 m (5' 6.14\").    Weight as " of this encounter: 60.5 kg (133 lb 6.4 oz).    Physical Exam  GENERAL: alert and no distress  EYES: Eyes grossly normal to inspection, PERRL and conjunctivae and sclerae normal  HENT: ear canals and TM's normal, nose and mouth without ulcers or lesions  NECK: no adenopathy, no asymmetry, masses, or scars  RESP: lungs clear to auscultation - no rales, rhonchi or wheezes  CV: regular rate and rhythm, normal S1 S2, no S3 or S4, no murmur, click or rub, no peripheral edema  ABDOMEN: soft, nontender, no hepatosplenomegaly, no masses and bowel sounds normal  MS: no gross musculoskeletal defects noted, no edema  SKIN: no suspicious lesions or rashes  NEURO: Normal strength and tone, mentation intact and speech normal  PSYCH: mentation appears normal, affect normal/bright    Assessment & Plan     Routine general medical examination at a health care facility    History of cervical dysplasia    Patient has been advised of split billing requirements and indicates understanding: Yes        Nicotine/Tobacco Cessation  She reports that she has been smoking cigarettes. She started smoking about 10 years ago. She has a 5.1 pack-year smoking history. She has never used smokeless tobacco.  Nicotine/Tobacco Cessation Plan  Information offered: Patient not interested at this time      Counseling  Appropriate preventive services were addressed with this patient via screening, questionnaire, or discussion as appropriate for fall prevention, nutrition, physical activity, Tobacco-use cessation, weight loss and cognition.  Checklist reviewing preventive services available has been given to the patient.  Reviewed patient's diet, addressing concerns and/or questions.   She is at risk for lack of exercise and has been provided with information to increase physical activity for the benefit of her well-being.   The patient was instructed to see the dentist every 6 months.       Signed Electronically by: Ryanne Terrazas PA-C

## 2025-01-21 ENCOUNTER — HOSPITAL ENCOUNTER (EMERGENCY)
Facility: CLINIC | Age: 35
Discharge: HOME | End: 2025-01-21
Attending: FAMILY MEDICINE | Admitting: FAMILY MEDICINE

## 2025-01-21 ENCOUNTER — APPOINTMENT (OUTPATIENT)
Dept: GENERAL RADIOLOGY | Facility: CLINIC | Age: 35
End: 2025-01-21
Attending: FAMILY MEDICINE

## 2025-01-21 VITALS
BODY MASS INDEX: 21.49 KG/M2 | TEMPERATURE: 98.3 F | RESPIRATION RATE: 18 BRPM | HEART RATE: 86 BPM | WEIGHT: 133.7 LBS | DIASTOLIC BLOOD PRESSURE: 88 MMHG | OXYGEN SATURATION: 100 % | SYSTOLIC BLOOD PRESSURE: 137 MMHG

## 2025-01-21 DIAGNOSIS — S60.012A CONTUSION OF LEFT THUMB WITHOUT DAMAGE TO NAIL, INITIAL ENCOUNTER: ICD-10-CM

## 2025-01-21 PROCEDURE — 99283 EMERGENCY DEPT VISIT LOW MDM: CPT | Performed by: FAMILY MEDICINE

## 2025-01-21 PROCEDURE — 73130 X-RAY EXAM OF HAND: CPT | Mod: LT

## 2025-01-21 ASSESSMENT — COLUMBIA-SUICIDE SEVERITY RATING SCALE - C-SSRS
6. HAVE YOU EVER DONE ANYTHING, STARTED TO DO ANYTHING, OR PREPARED TO DO ANYTHING TO END YOUR LIFE?: NO
2. HAVE YOU ACTUALLY HAD ANY THOUGHTS OF KILLING YOURSELF IN THE PAST MONTH?: NO
1. IN THE PAST MONTH, HAVE YOU WISHED YOU WERE DEAD OR WISHED YOU COULD GO TO SLEEP AND NOT WAKE UP?: NO

## 2025-01-21 ASSESSMENT — ACTIVITIES OF DAILY LIVING (ADL): ADLS_ACUITY_SCORE: 41

## 2025-01-21 NOTE — ED TRIAGE NOTES
She was working at the [employer] this morning and her L hand got stuck in a roller.It is swollen and bruised.     Triage Assessment (Adult)       Row Name 01/21/25 0812          Triage Assessment    Airway WDL WDL        Respiratory WDL    Respiratory WDL WDL        Skin Circulation/Temperature WDL    Skin Circulation/Temperature WDL WDL        Cardiac WDL    Cardiac WDL WDL        Peripheral/Neurovascular WDL    Peripheral Neurovascular WDL WDL

## 2025-01-21 NOTE — ED PROVIDER NOTES
History     Chief Complaint   Patient presents with    Hand Injury     HPI  Destiny Vaughn is a 34 year old female who presents with a work-related injury.  Patient had a crush injury to her left thumb area from machinery at work.  Patient is having pain over the base of her thumb.  Patient has normal range of motion but hurts to move.  Patient states it feels kind of numb but painful.  Denies any other injuries.    Allergies:  Allergies   Allergen Reactions    No Known Drug Allergy        Problem List:    Patient Active Problem List    Diagnosis Date Noted    History of cervical dysplasia 05/06/2022     Priority: Medium     S/P Hysterectomy          Past Medical History:    Past Medical History:   Diagnosis Date    Adjustment reaction with anxiety and depression 08/11/2011    ASC-H Pap smear of cervix ( ASCUS, cannot exclude HGSIL) 05/01/2017    Cervical high risk HPV (human papillomavirus) test positive 05/11/2018    H/O colposcopy with cervical biopsy 05/19/2017       Past Surgical History:    Past Surgical History:   Procedure Laterality Date    CYSTOSCOPY N/A 10/31/2022    Procedure: Cystoscopy;  Surgeon: Vernon Alegria MD;  Location: [facility]    HYSTERECTOMY, PAP NO LONGER INDICATED      LAPAROSCOPIC HYSTERECTOMY TOTAL, SALPINGECTOMY BILATERAL Bilateral 10/31/2022    Procedure: HYSTERECTOMY, TOTAL, LAPAROSCOPIC, WITH BILATERAL SALPINGECTOMY;  Surgeon: Vernon Alegria MD;  Location: [facility] OR       Family History:    Family History   Problem Relation Age of Onset    Hypertension Father     Breast Cancer Maternal Grandmother         50-60    Obesity Maternal Grandfather     Diabetes Paternal Grandmother     Obesity Paternal Grandmother     Cancer Paternal Grandfather     Lung Cancer Paternal Grandfather     Bladder Cancer Paternal Grandfather     Breast Cancer Maternal Aunt 50    Kidney Disease Maternal Aunt        Social History:  Marital Status:   [4]  Social History     Tobacco Use     Smoking status: Every Day     Current packs/day: 0.50     Average packs/day: 0.5 packs/day for 10.8 years (5.4 ttl pk-yrs)     Types: Cigarettes     Start date: 4/19/2014    Smokeless tobacco: Never   Vaping Use    Vaping status: Never Used   Substance Use Topics    Alcohol use: Yes     Alcohol/week: 5.0 standard drinks of alcohol     Types: 5 Standard drinks or equivalent per week     Comment: 5 per week    Drug use: No        Medications:    No current outpatient medications on file.        Review of Systems   All other systems reviewed and are negative.      Physical Exam   BP: 137/88  Pulse: 86  Temp: 98.3  F (36.8  C)  Resp: 18  Weight: 60.6 kg (133 lb 11.2 oz)  SpO2: 100 %      Physical Exam  Vitals and nursing note reviewed.   Constitutional:       General: She is not in acute distress.     Appearance: Normal appearance. She is not ill-appearing.   Musculoskeletal:      Left hand: Swelling, tenderness and bony tenderness present. No deformity or lacerations. Decreased range of motion. Normal strength. Normal sensation. There is no disruption of two-point discrimination. Normal capillary refill. Normal pulse.   Skin:     General: Skin is warm.      Capillary Refill: Capillary refill takes less than 2 seconds.   Neurological:      Mental Status: She is alert.         ED Course        Procedures           Results for orders placed or performed during the hospital encounter of 01/21/25 (from the past 24 hours)   XR Hand Left G/E 3 Views    Narrative    EXAM: XR HAND LEFT G/E 3 VIEWS  LOCATION: [facility]  DATE: 1/21/2025    INDICATION: Trauma  COMPARISON: None.      Impression    IMPRESSION: Normal joint spaces and alignment. No fracture.       Medications - No data to display    X-ray of the thumb was normal.  Think patient just likely has a bone bruise to her hand and thumb.  Will put an Ace wrap on the hand for compression, recommend aggressive icing, continue to alternate  Tylenol and ibuprofen.  Patient will follow-up if there is no improvement over the next few days.    Assessments & Plan (with Medical Decision Making)  Thumb contusion     I have reviewed the nursing notes.    I have reviewed the findings, diagnosis, plan and need for follow up with the patient.        1/21/2025   [facility] EMERGENCY DEPT       Guerrero Kimball MD  01/21/25 0817

## 2025-02-03 ENCOUNTER — VIRTUAL VISIT (OUTPATIENT)
Dept: FAMILY MEDICINE | Facility: CLINIC | Age: 35
End: 2025-02-03
Payer: COMMERCIAL

## 2025-02-03 DIAGNOSIS — Z71.6 ENCOUNTER FOR SMOKING CESSATION COUNSELING: Primary | ICD-10-CM

## 2025-02-03 PROCEDURE — 98005 SYNCH AUDIO-VIDEO EST LOW 20: CPT

## 2025-02-03 RX ORDER — NICOTINE 21 MG/24HR
1 PATCH, TRANSDERMAL 24 HOURS TRANSDERMAL EVERY 24 HOURS
Qty: 42 PATCH | Refills: 0 | Status: SHIPPED | OUTPATIENT
Start: 2025-02-03

## 2025-02-03 RX ORDER — NICOTINE 21 MG/24HR
1 PATCH, TRANSDERMAL 24 HOURS TRANSDERMAL EVERY 24 HOURS
Qty: 14 PATCH | Refills: 0 | Status: SHIPPED | OUTPATIENT
Start: 2025-02-03

## 2025-02-03 RX ORDER — VARENICLINE TARTRATE 0.5 (11)-1
KIT ORAL
Qty: 53 TABLET | Refills: 0 | Status: SHIPPED | OUTPATIENT
Start: 2025-02-03

## 2025-02-03 NOTE — PATIENT INSTRUCTIONS
NICOTINE PATCHES FOR CIGARETTE USERS  10-30 cigarettes daily (21 mg x 6 weeks, 14 mg x 2 weeks, 7 mg x 2 weeks)  Available over the counter  Possible side effects include skin irritation, insomnia, vivid dreams    Chantix (varenicline)  Start 1-2 weeks prior to quit date  Plan to be on it for 3-6 months, but can be longer if needed  Common side effects:  nightmares, GI intolerance (can try a lower dose)  Starter pack is 0.5 mg-1 mg (first month) then Rx is for 1 mg tablets  Avoid in patients with unstable psychiatric management or with suicidal ideation    ASSESSMENT & PLAN    Smoking Cessation:  - Long-term smoker, approximately one pack per day. Patient has attempted to quit in the past, particularly during pregnancies, but resumed smoking afterward. Expressed interest in trying Chantix (Varenicline) despite concerns about potential side effects such as vivid nightmares.  - Prescribe Chantix with a starter pack regimen: 0.5 mg once daily for 3 days, then 0.5 mg twice daily for 4 days, followed by 1 mg twice daily. Begin Chantix 1-2 weeks before the quit date, set for February 10, 2025. Prescribe nicotine replacement therapy with patches: 21 mg patch for 6 weeks, 14 mg patch for 2 weeks, and 7 mg patch for 2 weeks. Schedule follow-up video visit on February 18, 2025, at 1:30 PM to assess progress and side effects.  - Risks and side effects: Discussed potential side effects of Chantix, including vivid dreams or nightmares. Patient is aware and agreed to monitor and report any adverse effects.

## 2025-02-03 NOTE — PROGRESS NOTES
Destiny is a 34 year old who is being evaluated via a billable video visit.    How would you like to obtain your AVS? MyChart  If the video visit is dropped, the invitation should be resent by: Text to cell phone: [phone]  Will anyone else be joining your video visit? No      Assessment & Plan     Encounter for smoking cessation counseling  - varenicline (CHANTIX TIGRE) 0.5 MG X 11 & 1 MG X 42 tablet; Take 0.5 mg tab daily for 3 days, THEN 0.5 mg tab twice daily for 4 days, THEN 1 mg twice daily.  - nicotine (NICODERM CQ) 21 MG/24HR 24 hr patch; Place 1 patch over 24 hours onto the skin every 24 hours.  - nicotine (NICODERM CQ) 14 MG/24HR 24 hr patch; Place 1 patch over 24 hours onto the skin every 24 hours.  - nicotine (NICODERM CQ) 7 MG/24HR 24 hr patch; Place 1 patch over 24 hours onto the skin every 24 hours for 14 days.    See Patient Instructions  Patient Instructions   NICOTINE PATCHES FOR CIGARETTE USERS  10-30 cigarettes daily (21 mg x 6 weeks, 14 mg x 2 weeks, 7 mg x 2 weeks)  Available over the counter  Possible side effects include skin irritation, insomnia, vivid dreams    Chantix (varenicline)  Start 1-2 weeks prior to quit date  Plan to be on it for 3-6 months, but can be longer if needed  Common side effects:  nightmares, GI intolerance (can try a lower dose)  Starter pack is 0.5 mg-1 mg (first month) then Rx is for 1 mg tablets  Avoid in patients with unstable psychiatric management or with suicidal ideation    ASSESSMENT & PLAN    Smoking Cessation:  - Long-term smoker, approximately one pack per day. Patient has attempted to quit in the past, particularly during pregnancies, but resumed smoking afterward. Expressed interest in trying Chantix (Varenicline) despite concerns about potential side effects such as vivid nightmares.  - Prescribe Chantix with a starter pack regimen: 0.5 mg once daily for 3 days, then 0.5 mg twice daily for 4 days, followed by 1 mg twice daily. Begin Chantix 1-2 weeks  before the quit date, set for February 10, 2025. Prescribe nicotine replacement therapy with patches: 21 mg patch for 6 weeks, 14 mg patch for 2 weeks, and 7 mg patch for 2 weeks. Schedule follow-up video visit on February 18, 2025, at 1:30 PM to assess progress and side effects.  - Risks and side effects: Discussed potential side effects of Chantix, including vivid dreams or nightmares. Patient is aware and agreed to monitor and report any adverse effects.      Waleska Dixon is a 34 year old, presenting for the following health issues:  Nicotine Dependence (Would like to stop and explore options )        2/3/2025    12:48 PM   Additional Questions   Roomed by Michele     Nicotine Dependence    History of Present Illness       Reason for visit:  Quitting smoking   She is taking medications regularly.     WALESKA    , a 34-year-old female, is seeking assistance for smoking cessation. She has attempted to quit smoking in the past, particularly during her pregnancies, which made it easier for her to stop due to the impact on her unborn child. However, she resumed smoking after each pregnancy. Currently, she smokes approximately a pack of cigarettes per day and has been smoking for 35 years.     is exploring options to help her quit smoking, acknowledging that she needs extra support this time. She is aware of various smoking cessation aids, including nicotine replacement therapies and medications like Chantix (Varenicline) and Wellbutrin (bupropion). She has heard positive feedback about Chantix from others but is concerned about potential side effects, such as vivid nightmares. Despite these concerns, she is open to trying Chantix and is willing to switch if the side effects become too bothersome.    Due to her work environment,  is unable to use nicotine gum or lozenges, as chewing gum is not permitted. She is considering using nicotine patches as part of her cessation plan.  is committed to quitting smoking  and has agreed to a follow-up appointment to assess her progress and address any issues that may arise during her cessation journey. She has scheduled a follow-up video visit for February 18, 2025, at 1:30 PM.                  Objective    Vitals - Patient Reported  Weight (Patient Reported): 60.3 kg (133 lb)  Pain Score: No Pain (0)        Physical Exam   GENERAL: alert and no distress  EYES: Eyes grossly normal to inspection.  No discharge or erythema, or obvious scleral/conjunctival abnormalities.  RESP: No audible wheeze, cough, or visible cyanosis.    SKIN: Visible skin clear. No significant rash, abnormal pigmentation or lesions.  NEURO: Cranial nerves grossly intact.  Mentation and speech appropriate for age.  PSYCH: Appropriate affect, tone, and pace of words          Video-Visit Details    Type of service:  Video Visit   Originating Location (pt. Location): Home    Distant Location (provider location):  On-site  Platform used for Video Visit: [employer]  Signed Electronically by: Demetra Zamorano PA-C

## 2025-02-18 ENCOUNTER — TELEPHONE (OUTPATIENT)
Dept: FAMILY MEDICINE | Facility: CLINIC | Age: 35
End: 2025-02-18

## 2025-02-18 NOTE — TELEPHONE ENCOUNTER
Patient Returning Call  Would like to schedule a follow up virtual appt   Reason for call:  n/a    Information relayed to patient:  n/a    Patient has additional questions:  No      Could we send this information to you in [employer] or would you prefer to receive a phone call?:   Patient would prefer a phone call   Okay to leave a detailed message?: Yes at Cell number on file:    Telephone Information:   Mobile [phone]

## 2025-06-11 ENCOUNTER — OFFICE VISIT (OUTPATIENT)
Dept: FAMILY MEDICINE | Facility: CLINIC | Age: 35
End: 2025-06-11
Payer: COMMERCIAL

## 2025-06-11 VITALS
DIASTOLIC BLOOD PRESSURE: 70 MMHG | SYSTOLIC BLOOD PRESSURE: 118 MMHG | HEART RATE: 60 BPM | OXYGEN SATURATION: 98 % | HEIGHT: 65 IN | BODY MASS INDEX: 24.87 KG/M2 | WEIGHT: 149.3 LBS | TEMPERATURE: 97.5 F | RESPIRATION RATE: 12 BRPM

## 2025-06-11 DIAGNOSIS — Z00.00 ROUTINE GENERAL MEDICAL EXAMINATION AT A HEALTH CARE FACILITY: Primary | ICD-10-CM

## 2025-06-11 DIAGNOSIS — Z90.710 S/P HYSTERECTOMY: ICD-10-CM

## 2025-06-11 DIAGNOSIS — Z87.410 HISTORY OF CERVICAL DYSPLASIA: ICD-10-CM

## 2025-06-11 DIAGNOSIS — Z13.1 SCREENING FOR DIABETES MELLITUS: ICD-10-CM

## 2025-06-11 DIAGNOSIS — Z87.891 HISTORY OF SMOKING: ICD-10-CM

## 2025-06-11 PROCEDURE — 3074F SYST BP LT 130 MM HG: CPT | Performed by: STUDENT IN AN ORGANIZED HEALTH CARE EDUCATION/TRAINING PROGRAM

## 2025-06-11 PROCEDURE — 99395 PREV VISIT EST AGE 18-39: CPT | Performed by: STUDENT IN AN ORGANIZED HEALTH CARE EDUCATION/TRAINING PROGRAM

## 2025-06-11 PROCEDURE — 3078F DIAST BP <80 MM HG: CPT | Performed by: STUDENT IN AN ORGANIZED HEALTH CARE EDUCATION/TRAINING PROGRAM

## 2025-06-11 PROCEDURE — 1126F AMNT PAIN NOTED NONE PRSNT: CPT | Performed by: STUDENT IN AN ORGANIZED HEALTH CARE EDUCATION/TRAINING PROGRAM

## 2025-06-11 SDOH — HEALTH STABILITY: PHYSICAL HEALTH: ON AVERAGE, HOW MANY DAYS PER WEEK DO YOU ENGAGE IN MODERATE TO STRENUOUS EXERCISE (LIKE A BRISK WALK)?: 2 DAYS

## 2025-06-11 SDOH — HEALTH STABILITY: PHYSICAL HEALTH: ON AVERAGE, HOW MANY MINUTES DO YOU ENGAGE IN EXERCISE AT THIS LEVEL?: 20 MIN

## 2025-06-11 ASSESSMENT — PAIN SCALES - GENERAL: PAINLEVEL_OUTOF10: NO PAIN (0)

## 2025-06-11 ASSESSMENT — SOCIAL DETERMINANTS OF HEALTH (SDOH): HOW OFTEN DO YOU GET TOGETHER WITH FRIENDS OR RELATIVES?: MORE THAN THREE TIMES A WEEK

## 2025-06-11 NOTE — PATIENT INSTRUCTIONS
Patient Education   Preventive Care Advice   This is general advice given by our system to help you stay healthy. However, your care team may have specific advice just for you. Please talk to your care team about your preventive care needs.  Nutrition  Eat 5 or more servings of fruits and vegetables each day.  Try wheat bread, brown rice and whole grain pasta (instead of white bread, rice, and pasta).  Get enough calcium and vitamin D. Check the label on foods and aim for 100% of the RDA (recommended daily allowance).  Lifestyle  Exercise at least 150 minutes each week  (30 minutes a day, 5 days a week).  Do muscle strengthening activities 2 days a week. These help control your weight and prevent disease.  No smoking.  Wear sunscreen to prevent skin cancer.  Have a dental exam and cleaning every 6 months.  Yearly exams  See your health care team every year to talk about:  Any changes in your health.  Any medicines your care team has prescribed.  Preventive care, family planning, and ways to prevent chronic diseases.  Shots (vaccines)   HPV shots (up to age 26), if you've never had them before.  Hepatitis B shots (up to age 59), if you've never had them before.  COVID-19 shot: Get this shot when it's due.  Flu shot: Get a flu shot every year.  Tetanus shot: Get a tetanus shot every 10 years.  Pneumococcal, hepatitis A, and RSV shots: Ask your care team if you need these based on your risk.  Shingles shot (for age 50 and up)  General health tests  Diabetes screening:  Starting at age 35, Get screened for diabetes at least every 3 years.  If you are younger than age 35, ask your care team if you should be screened for diabetes.  Cholesterol test: At age 39, start having a cholesterol test every 5 years, or more often if advised.  Bone density scan (DEXA): At age 50, ask your care team if you should have this scan for osteoporosis (brittle bones).  Hepatitis C: Get tested at least once in your life.  STIs (sexually  transmitted infections)  Before age 24: Ask your care team if you should be screened for STIs.  After age 24: Get screened for STIs if you're at risk. You are at risk for STIs (including HIV) if:  You are sexually active with more than one person.  You don't use condoms every time.  You or a partner was diagnosed with a sexually transmitted infection.  If you are at risk for HIV, ask about PrEP medicine to prevent HIV.  Get tested for HIV at least once in your life, whether you are at risk for HIV or not.  Cancer screening tests  Cervical cancer screening: If you have a cervix, begin getting regular cervical cancer screening tests starting at age 21.  Breast cancer scan (mammogram): If you've ever had breasts, begin having regular mammograms starting at age 40. This is a scan to check for breast cancer.  Colon cancer screening: It is important to start screening for colon cancer at age 45.  Have a colonoscopy test every 10 years (or more often if you're at risk) Or, ask your provider about stool tests like a FIT test every year or Cologuard test every 3 years.  To learn more about your testing options, visit:   .  For help making a decision, visit:   https://bit.ly/cg91315.  Prostate cancer screening test: If you have a prostate, ask your care team if a prostate cancer screening test (PSA) at age 55 is right for you.  Lung cancer screening: If you are a current or former smoker ages 50 to 80, ask your care team if ongoing lung cancer screenings are right for you.  For informational purposes only. Not to replace the advice of your health care provider. Copyright   2023 [facility]. All rights reserved. Clinically reviewed by the [facility] Transitions Program. [employer] 071486 - REV 01/24.  Safer Sex: Care Instructions  Overview  Safer sex is a way to reduce your risk of getting a sexually transmitted infection (STI). It can also help prevent pregnancy.  Several products can help you practice  safer sex and reduce your chance of STIs. One of the best is a condom. There are internal and external condoms. You can use a special rubber sheet (dental dam) for protection during oral sex. Disposable gloves can keep your hands from touching blood, semen, or other body fluids that can carry infections.  Remember that birth control methods such as diaphragms, IUDs, foams, and birth control pills do not stop you from getting STIs.  Follow-up care is a key part of your treatment and safety. Be sure to make and go to all appointments, and call your doctor if you are having problems. It's also a good idea to know your test results and keep a list of the medicines you take.  How can you care for yourself at home?  Think about getting vaccinated to help prevent hepatitis A, hepatitis B, and human papillomavirus (HPV). They can be spread through sex.  Use a condom every time you have sex. Use an external condom, which goes on the penis. Or use an internal condom, which goes into the vagina or anus.  Make sure you use the right size external condom. A condom that's too small can break easily. A condom that's too big can slip off during sex.  Use a new condom each time you have sex. Be careful not to poke a hole in the condom when you open the wrapper.  Don't use an internal condom and an external condom at the same time.  Never use petroleum jelly (such as Vaseline), grease, hand lotion, baby oil, or anything with oil in it. These products can make holes in the condom.  After intercourse, hold the edge of the condom as you remove it. This will help keep semen from spilling out of the condom.  Do not have sex with anyone who has symptoms of an STI, such as sores on the genitals or mouth.  Do not drink a lot of alcohol or use drugs before sex.  Limit your sex partners. Sex with one partner who has sex only with you can reduce your risk of getting an STI.  Don't share sex toys. But if you do share them, use a condom and clean  "the sex toys between each use.  Talk to any partners before you have sex. Talk about what you feel comfortable with and whether you have any boundaries with sex. And find out if your partner or partners may be at risk for any STI. Keep in mind that a person may be able to spread an STI even if they do not have symptoms. You and any partners may want to get tested for STIs.  Where can you learn more?  Go to https://www.[employer].net/patiented  Enter B608 in the search box to learn more about \"Safer Sex: Care Instructions.\"  Current as of: April 30, 2024  Content Version: 14.5    8172-5370 [employer].   Care instructions adapted under license by your healthcare professional. If you have questions about a medical condition or this instruction, always ask your healthcare professional. [employer] disclaims any warranty or liability for your use of this information.       "

## 2025-06-11 NOTE — PROGRESS NOTES
Preventive Care Visit  [facility]  Hema Barber MD, Family Medicine  Jun 11, 2025      Assessment & Plan   Problem List Items Addressed This Visit          Behavioral    History of smoking       Other    History of cervical dysplasia    S/P hysterectomy     Other Visit Diagnoses         Routine general medical examination at a health care facility    -  Primary      Screening for diabetes mellitus               Overall doing well.  Age-appropriate screening and immunization discussed.  Congratulated on smoking cessation.  Previous lipids and diabetes screening normal and she does not want repeat labs today.  I did encourage repeat HPV screening and immunization given her history of PAPO-2 with hysterectomy over 2 years ago.  She does not want today but tells me she will do in the fall.  Continue to focus on diet and exercise.    Patient has been advised of split billing requirements and indicates understanding: Yes       Counseling  Appropriate preventive services were addressed with this patient via screening, questionnaire, or discussion as appropriate for fall prevention, nutrition, physical activity, Tobacco-use cessation, social engagement, weight loss and cognition.  Checklist reviewing preventive services available has been given to the patient.  Reviewed patient's diet, addressing concerns and/or questions.   She is at risk for lack of exercise and has been provided with information to increase physical activity for the benefit of her well-being.       Marlon Dixon is a 34 year old, presenting for the following:  Physical        6/11/2025     4:24 PM   Additional Questions   Roomed by Roseanne HI         6/11/2025   Forms   Any forms needing to be completed Yes         6/11/2025     4:24 PM   Patient Reported Additional Medications   Patient reports taking the following new medications hair, skin nail vitamin          HPI        Advance Care Planning    Discussed  advance care planning with patient; informed AVS has link to [employer].        6/11/2025   General Health   How would you rate your overall physical health? Good   Feel stress (tense, anxious, or unable to sleep) Not at all         6/11/2025   Nutrition   Three or more servings of calcium each day? (!) NO   Diet: Regular (no restrictions)   How many servings of fruit and vegetables per day? (!) 0-1   How many sweetened beverages each day? 0-1         6/11/2025   Exercise   Days per week of moderate/strenous exercise 2 days   Average minutes spent exercising at this level 20 min   (!) EXERCISE CONCERN      6/11/2025   Social Factors   Frequency of gathering with friends or relatives More than three times a week   Worry food won't last until get money to buy more No   Food not last or not have enough money for food? No   Do you have housing? (Housing is defined as stable permanent housing and does not include staying outside in a car, in a tent, in an abandoned building, in an overnight shelter, or couch-surfing.) Yes   Are you worried about losing your housing? No   Lack of transportation? No   Unable to get utilities (heat,electricity)? No         6/11/2025   Dental   Dentist two times every year? Yes           Today's PHQ-2 Score:       2/3/2025     5:13 AM   PHQ-2 ( 1999 [employer])   Q1: Little interest or pleasure in doing things 0   Q2: Feeling down, depressed or hopeless 0   PHQ-2 Score 0    Q1: Little interest or pleasure in doing things Not at all   Q2: Feeling down, depressed or hopeless Not at all   PHQ-2 Score 0       Patient-reported         6/11/2025   Substance Use   Alcohol more than 3/day or more than 7/wk No   Do you use any other substances recreationally? No     Social History     Tobacco Use    Smoking status: Former     Current packs/day: 0.50     Average packs/day: 0.5 packs/day for 11.1 years (5.6 ttl pk-yrs)     Types: Cigarettes     Start date: 4/19/2014    Smokeless tobacco: Never    Vaping Use    Vaping status: Never Used   Substance Use Topics    Alcohol use: Yes     Alcohol/week: 5.0 standard drinks of alcohol     Types: 5 Standard drinks or equivalent per week     Comment: 5 per week    Drug use: No           7/25/2023   LAST FHS-7 RESULTS   1st degree relative breast or ovarian cancer Unknown   Any relative bilateral breast cancer Unknown   Any male have breast cancer No   Any ONE woman have BOTH breast AND ovarian cancer Unknown   Any woman with breast cancer before 50yrs Unknown   2 or more relatives with breast AND/OR ovarian cancer Yes   2 or more relatives with breast AND/OR bowel cancer Unknown        Mammogram Screening - Patient under 40 years of age: Routine Mammogram Screening not recommended.         6/11/2025   STI Screening   New sexual partner(s) since last STI/HIV test? (!) YES      History of abnormal Pap smear: Status post hysterectomy. Pap still indicated.         Latest Ref Rng & Units 3/22/2022     9:45 AM 3/8/2019     2:17 PM 3/8/2019     2:05 PM   PAP / HPV   PAP  Negative for Intraepithelial Lesion or Malignancy (NILM)      PAP (Historical)    NIL    HPV 16 DNA Negative Positive  Positive     HPV 18 DNA Negative Negative  Negative     Other HR HPV Negative Negative  Negative            Reviewed and updated as needed this visit by Provider                    Past Medical History:   Diagnosis Date    Adjustment reaction with anxiety and depression 08/11/2011    Formatting of this note might be different from the original. postpartum    ASC-H Pap smear of cervix ( ASCUS, cannot exclude HGSIL) 05/01/2017    Cervical high risk HPV (human papillomavirus) test positive 05/11/2018 05/11/18: See problem list.     H/O colposcopy with cervical biopsy 05/19/2017    PAPO 2     Past Surgical History:   Procedure Laterality Date    CYSTOSCOPY N/A 10/31/2022    Procedure: Cystoscopy;  Surgeon: Vernon Alegria MD;  Location: [facility] OR    HYSTERECTOMY, PAP NO LONGER  "INDICATED      LAPAROSCOPIC HYSTERECTOMY TOTAL, SALPINGECTOMY BILATERAL Bilateral 10/31/2022    Procedure: HYSTERECTOMY, TOTAL, LAPAROSCOPIC, WITH BILATERAL SALPINGECTOMY;  Surgeon: Vernon Alegria MD;  Location: [facility] OR     OB History    Para Term  AB Living   3 3 2 0 0 3   SAB IAB Ectopic Multiple Live Births   0 0 0 0 2      # Outcome Date GA Lbr Aaron/2nd Weight Sex Type Anes PTL Lv   3 Term 16 40w2d 02:14 / 00:23 3.399 kg (7 lb 7.9 oz) F Vag-Spont EPI  STERLING      Apgar1: 9  Apgar5: 10   2 Term 11/10/14 40w4d 02:39 / 00:56 3.68 kg (8 lb 1.8 oz) F  INT        Name: Kelsea      Apgar1: 9  Apgar5: 9   1 Para 11 41w4d 05:23 / 01:30 3.315 kg (7 lb 4.9 oz) F IVD EPI N STERLING      Birth Comments: Uncomplicated       Name: Radhika      Apgar1: 8  Apgar5: 9      Obstetric Comments   EDC 2016 based on est. LMP.  (Will have ultrasound to confirm dating.)   to Jared.           Review of Systems  Constitutional, HEENT, cardiovascular, pulmonary, GI, , musculoskeletal, neuro, skin, endocrine and psych systems are negative, except as otherwise noted.     Objective    Exam  /70   Pulse 60   Temp 97.5  F (36.4  C) (Temporal)   Resp 12   Ht 1.657 m (5' 5.25\")   Wt 67.7 kg (149 lb 4.8 oz)   LMP  (LMP Unknown)   SpO2 98%   BMI 24.65 kg/m     Estimated body mass index is 24.65 kg/m  as calculated from the following:    Height as of this encounter: 1.657 m (5' 5.25\").    Weight as of this encounter: 67.7 kg (149 lb 4.8 oz).    Physical Exam  GENERAL: alert and no distress  EYES: Eyes grossly normal to inspection, PERRL and conjunctivae and sclerae normal  HENT: ear canals and TM's normal, nose and mouth without ulcers or lesions  NECK: no adenopathy, no asymmetry, masses, or scars  RESP: lungs clear to auscultation - no rales, rhonchi or wheezes  CV: regular rate and rhythm, normal S1 S2, no S3 or S4, no murmur, click or rub, no peripheral edema  ABDOMEN: soft, nontender, no " hepatosplenomegaly, no masses and bowel sounds normal  MS: no gross musculoskeletal defects noted, no edema  SKIN: no suspicious lesions or rashes  NEURO: Normal strength and tone, mentation intact and speech normal  PSYCH: mentation appears normal, affect normal/bright        Signed Electronically by: Hema Barber MD

## 2025-06-11 NOTE — NURSING NOTE
Prior to immunization administration, verified patients identity using patient s name and date of birth. Please see Immunization Activity for additional information.     Screening Questionnaire for Adult Immunization    Are you sick today?   Yes   Do you have allergies to medications, food, a vaccine component or latex?   No   Have you ever had a serious reaction after receiving a vaccination?   No   Do you have a long-term health problem with heart, lung, kidney, or metabolic disease (e.g., diabetes), asthma, a blood disorder, no spleen, complement component deficiency, a cochlear implant, or a spinal fluid leak?  Are you on long-term aspirin therapy?   No   Do you have cancer, leukemia, HIV/AIDS, or any other immune system problem?   No   Do you have a parent, brother, or sister with an immune system problem?   No   In the past 3 months, have you taken medications that affect  your immune system, such as prednisone, other steroids, or anticancer drugs; drugs for the treatment of rheumatoid arthritis, Crohn s disease, or psoriasis; or have you had radiation treatments?   No   Have you had a seizure, or a brain or other nervous system problem?   No   During the past year, have you received a transfusion of blood or blood    products, or been given immune (gamma) globulin or antiviral drug?   No   For women: Are you pregnant or is there a chance you could become       pregnant during the next month?   No   Have you received any vaccinations in the past 4 weeks?   No     Immunization questionnaire was positive for at least one answer.  Notified Dr. Barber.      Patient instructed to remain in clinic for 15 minutes afterwards, and to report any adverse reactions.     Screening performed by Roseanne Pandya LPN on 6/11/2025 at 4:28 PM.

## (undated) DEVICE — PACK AB HYST/LITHOTOMY CUSTOM NORTHLAND

## (undated) DEVICE — SU VICRYL 0 CT-1 36" J346H

## (undated) DEVICE — PREP SCRUB SOL EXIDINE 4% CHG 4OZ 29002-404

## (undated) DEVICE — GLOVE PROTEXIS W/NEU-THERA 7.5  2D73TE75

## (undated) DEVICE — ESU LIGASURE BLUNT TIP 5MM LF1537

## (undated) DEVICE — CATH TRAY FOLEY 16FR DRAINAGE BAG STATLOCK 899916

## (undated) DEVICE — NDL SPINAL 22GA 5" QUINCKE 405148

## (undated) DEVICE — SYR BULB IRRIG DOVER 60 ML LATEX FREE 67000

## (undated) DEVICE — ENDO TROCAR SLEEVE KII Z-THREADED 05X100MM CTS02

## (undated) DEVICE — ESU SUCTION/IRRIGATION SYSTEM PISTOL GRIP

## (undated) DEVICE — NDL INSUFFLATION 13GA 120MM C2201

## (undated) DEVICE — NDL ECLIPSE 25GA 1.5"

## (undated) DEVICE — SYR 10ML PREFILLED 0.9% NACL INJ NOT STERILE 306500

## (undated) DEVICE — DRAPE POUCH INSTRUMENT 3 POCKET 1018L

## (undated) DEVICE — ESU HOOK TIP 5MM CONMED

## (undated) DEVICE — SOL NACL 0.9% INJ 1000ML BAG 07983-09

## (undated) DEVICE — SUCTION CURETTE 3MM ENDOMETRIAL MX140

## (undated) DEVICE — SYR 50ML LL W/O NDL 309653

## (undated) DEVICE — DEVICE RUMI II KOH-EFFICIENT 3.0CM KC-RUMI-30

## (undated) DEVICE — UTERINE MANIPULATOR RUMI 6.7MMX8CM UMB678

## (undated) DEVICE — LUBRICATING JELLY 4.25OZ

## (undated) DEVICE — DEVICE SUTURE GRASPER TROCAR CLOSURE 14GA PMITCSG

## (undated) DEVICE — KIT PATIENT POSITIONING PIGAZZI LATEX FREE 40580

## (undated) DEVICE — ENDO TROCAR SHIELDED BLADED KII Z-THRD 05X100MM CTB03

## (undated) DEVICE — SU VICRYL 4-0 PS-2 27" UND J426H

## (undated) RX ORDER — ONDANSETRON 2 MG/ML
INJECTION INTRAMUSCULAR; INTRAVENOUS
Status: DISPENSED
Start: 2022-10-31

## (undated) RX ORDER — FENTANYL CITRATE 50 UG/ML
INJECTION, SOLUTION INTRAMUSCULAR; INTRAVENOUS
Status: DISPENSED
Start: 2022-10-31

## (undated) RX ORDER — DIMENHYDRINATE 50 MG/ML
INJECTION, SOLUTION INTRAMUSCULAR; INTRAVENOUS
Status: DISPENSED
Start: 2022-10-31

## (undated) RX ORDER — HYDROMORPHONE HYDROCHLORIDE 1 MG/ML
INJECTION, SOLUTION INTRAMUSCULAR; INTRAVENOUS; SUBCUTANEOUS
Status: DISPENSED
Start: 2022-10-31

## (undated) RX ORDER — LIDOCAINE HYDROCHLORIDE 10 MG/ML
INJECTION, SOLUTION EPIDURAL; INFILTRATION; INTRACAUDAL; PERINEURAL
Status: DISPENSED
Start: 2022-10-31

## (undated) RX ORDER — LIDOCAINE HYDROCHLORIDE 10 MG/ML
INJECTION, SOLUTION INFILTRATION; PERINEURAL
Status: DISPENSED
Start: 2022-10-31

## (undated) RX ORDER — EPINEPHRINE 1 MG/ML
INJECTION, SOLUTION INTRAMUSCULAR; SUBCUTANEOUS
Status: DISPENSED
Start: 2022-10-31

## (undated) RX ORDER — LIDOCAINE HYDROCHLORIDE 20 MG/ML
INJECTION, SOLUTION EPIDURAL; INFILTRATION; INTRACAUDAL; PERINEURAL
Status: DISPENSED
Start: 2022-10-31